# Patient Record
Sex: FEMALE | Race: WHITE
[De-identification: names, ages, dates, MRNs, and addresses within clinical notes are randomized per-mention and may not be internally consistent; named-entity substitution may affect disease eponyms.]

---

## 2020-10-07 ENCOUNTER — HOSPITAL ENCOUNTER (INPATIENT)
Dept: HOSPITAL 35 - ER | Age: 60
LOS: 12 days | Discharge: HOME HEALTH | DRG: 917 | End: 2020-10-19
Attending: HOSPITALIST | Admitting: HOSPITALIST
Payer: COMMERCIAL

## 2020-10-07 VITALS — SYSTOLIC BLOOD PRESSURE: 102 MMHG | DIASTOLIC BLOOD PRESSURE: 65 MMHG

## 2020-10-07 VITALS — DIASTOLIC BLOOD PRESSURE: 62 MMHG | SYSTOLIC BLOOD PRESSURE: 98 MMHG

## 2020-10-07 VITALS — DIASTOLIC BLOOD PRESSURE: 64 MMHG | SYSTOLIC BLOOD PRESSURE: 105 MMHG

## 2020-10-07 VITALS — WEIGHT: 275 LBS | HEIGHT: 65 IN | BODY MASS INDEX: 45.82 KG/M2

## 2020-10-07 VITALS — SYSTOLIC BLOOD PRESSURE: 106 MMHG | DIASTOLIC BLOOD PRESSURE: 64 MMHG

## 2020-10-07 VITALS — SYSTOLIC BLOOD PRESSURE: 125 MMHG | DIASTOLIC BLOOD PRESSURE: 82 MMHG

## 2020-10-07 VITALS — DIASTOLIC BLOOD PRESSURE: 69 MMHG | SYSTOLIC BLOOD PRESSURE: 105 MMHG

## 2020-10-07 VITALS — SYSTOLIC BLOOD PRESSURE: 101 MMHG | DIASTOLIC BLOOD PRESSURE: 61 MMHG

## 2020-10-07 VITALS — SYSTOLIC BLOOD PRESSURE: 101 MMHG | DIASTOLIC BLOOD PRESSURE: 62 MMHG

## 2020-10-07 VITALS — SYSTOLIC BLOOD PRESSURE: 105 MMHG | DIASTOLIC BLOOD PRESSURE: 65 MMHG

## 2020-10-07 VITALS — DIASTOLIC BLOOD PRESSURE: 65 MMHG | SYSTOLIC BLOOD PRESSURE: 109 MMHG

## 2020-10-07 VITALS — DIASTOLIC BLOOD PRESSURE: 78 MMHG | SYSTOLIC BLOOD PRESSURE: 106 MMHG

## 2020-10-07 VITALS — DIASTOLIC BLOOD PRESSURE: 67 MMHG | SYSTOLIC BLOOD PRESSURE: 105 MMHG

## 2020-10-07 VITALS — SYSTOLIC BLOOD PRESSURE: 107 MMHG | DIASTOLIC BLOOD PRESSURE: 44 MMHG

## 2020-10-07 VITALS — SYSTOLIC BLOOD PRESSURE: 101 MMHG | DIASTOLIC BLOOD PRESSURE: 55 MMHG

## 2020-10-07 VITALS — DIASTOLIC BLOOD PRESSURE: 58 MMHG | SYSTOLIC BLOOD PRESSURE: 96 MMHG

## 2020-10-07 VITALS — DIASTOLIC BLOOD PRESSURE: 67 MMHG | SYSTOLIC BLOOD PRESSURE: 99 MMHG

## 2020-10-07 VITALS — SYSTOLIC BLOOD PRESSURE: 123 MMHG | DIASTOLIC BLOOD PRESSURE: 78 MMHG

## 2020-10-07 DIAGNOSIS — J96.21: ICD-10-CM

## 2020-10-07 DIAGNOSIS — Z88.8: ICD-10-CM

## 2020-10-07 DIAGNOSIS — I10: ICD-10-CM

## 2020-10-07 DIAGNOSIS — E66.01: ICD-10-CM

## 2020-10-07 DIAGNOSIS — Z20.828: ICD-10-CM

## 2020-10-07 DIAGNOSIS — G89.4: ICD-10-CM

## 2020-10-07 DIAGNOSIS — E78.5: ICD-10-CM

## 2020-10-07 DIAGNOSIS — G93.41: ICD-10-CM

## 2020-10-07 DIAGNOSIS — I48.0: ICD-10-CM

## 2020-10-07 DIAGNOSIS — E11.42: ICD-10-CM

## 2020-10-07 DIAGNOSIS — E87.6: ICD-10-CM

## 2020-10-07 DIAGNOSIS — R13.10: ICD-10-CM

## 2020-10-07 DIAGNOSIS — Z79.899: ICD-10-CM

## 2020-10-07 DIAGNOSIS — F32.9: ICD-10-CM

## 2020-10-07 DIAGNOSIS — Z91.040: ICD-10-CM

## 2020-10-07 DIAGNOSIS — G47.33: ICD-10-CM

## 2020-10-07 DIAGNOSIS — Z79.4: ICD-10-CM

## 2020-10-07 DIAGNOSIS — E83.42: ICD-10-CM
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
## 2020-10-07 DIAGNOSIS — J96.22: ICD-10-CM

## 2020-10-07 DIAGNOSIS — Z88.0: ICD-10-CM

## 2020-10-07 DIAGNOSIS — K59.00: ICD-10-CM

## 2020-10-07 DIAGNOSIS — G93.1: ICD-10-CM

## 2020-10-07 DIAGNOSIS — T40.691A: Primary | ICD-10-CM

## 2020-10-07 DIAGNOSIS — M19.90: ICD-10-CM

## 2020-10-07 DIAGNOSIS — F41.9: ICD-10-CM

## 2020-10-07 DIAGNOSIS — Z86.73: ICD-10-CM

## 2020-10-07 DIAGNOSIS — Z79.82: ICD-10-CM

## 2020-10-07 DIAGNOSIS — K21.9: ICD-10-CM

## 2020-10-07 DIAGNOSIS — E87.5: ICD-10-CM

## 2020-10-07 DIAGNOSIS — J69.0: ICD-10-CM

## 2020-10-07 DIAGNOSIS — E11.65: ICD-10-CM

## 2020-10-07 DIAGNOSIS — D64.9: ICD-10-CM

## 2020-10-07 DIAGNOSIS — K81.9: ICD-10-CM

## 2020-10-07 DIAGNOSIS — Z88.1: ICD-10-CM

## 2020-10-07 LAB
ALBUMIN SERPL-MCNC: 3.6 G/DL (ref 3.4–5)
ALT SERPL-CCNC: 134 U/L (ref 30–65)
ANION GAP SERPL CALC-SCNC: 12 MMOL/L (ref 7–16)
ANION GAP SERPL CALC-SCNC: 5 MMOL/L (ref 7–16)
ANION GAP SERPL CALC-SCNC: 6 MMOL/L (ref 7–16)
AST SERPL-CCNC: 200 U/L (ref 15–37)
BACTERIA-REFLEX: (no result) /HPF
BASOPHILS NFR BLD AUTO: 0 % (ref 0–2)
BE(VIVO): 0.5 MMOL/L
BE(VIVO): 0.6 MMOL/L
BE(VIVO): 1.3 MMOL/L
BE(VIVO): 3.1 MMOL/L
BE(VIVO): 6.2 MMOL/L
BILIRUB SERPL-MCNC: 0.4 MG/DL (ref 0.2–1)
BILIRUB UR-MCNC: NEGATIVE MG/DL
BUN SERPL-MCNC: 12 MG/DL (ref 7–18)
BUN SERPL-MCNC: 13 MG/DL (ref 7–18)
BUN SERPL-MCNC: 13 MG/DL (ref 7–18)
CALCIUM SERPL-MCNC: 9 MG/DL (ref 8.5–10.1)
CALCIUM SERPL-MCNC: 9.3 MG/DL (ref 8.5–10.1)
CALCIUM SERPL-MCNC: 9.4 MG/DL (ref 8.5–10.1)
CHLORIDE SERPL-SCNC: 100 MMOL/L (ref 98–107)
CHLORIDE SERPL-SCNC: 101 MMOL/L (ref 98–107)
CHLORIDE SERPL-SCNC: 95 MMOL/L (ref 98–107)
CO2 SERPL-SCNC: 25 MMOL/L (ref 21–32)
CO2 SERPL-SCNC: 31 MMOL/L (ref 21–32)
CO2 SERPL-SCNC: 33 MMOL/L (ref 21–32)
COLOR UR: YELLOW
CREAT SERPL-MCNC: 0.8 MG/DL (ref 0.6–1)
CREAT SERPL-MCNC: 0.8 MG/DL (ref 0.6–1)
CREAT SERPL-MCNC: 1.3 MG/DL (ref 0.6–1)
EOSINOPHIL NFR BLD: 0 % (ref 0–3)
ERYTHROCYTE [DISTWIDTH] IN BLOOD BY AUTOMATED COUNT: 16.6 % (ref 10.5–14.5)
GLUCOSE SERPL-MCNC: 201 MG/DL (ref 74–106)
GLUCOSE SERPL-MCNC: 246 MG/DL (ref 74–106)
GLUCOSE SERPL-MCNC: 498 MG/DL (ref 74–106)
GRANULOCYTES NFR BLD MANUAL: 70 % (ref 36–66)
HCO3 BLD-SCNC: 31.1 MMOL/L (ref 22–26)
HCO3 BLD-SCNC: 31.1 MMOL/L (ref 22–26)
HCO3 BLD-SCNC: 31.9 MMOL/L (ref 22–26)
HCO3 BLD-SCNC: 32 MMOL/L (ref 22–26)
HCO3 BLD-SCNC: 34.6 MMOL/L (ref 22–26)
HCT VFR BLD CALC: 39.3 % (ref 37–47)
HGB BLD-MCNC: 11.4 GM/DL (ref 12–15)
KETONES UR STRIP-MCNC: NEGATIVE MG/DL
LYMPHOCYTES NFR BLD AUTO: 7 % (ref 24–44)
MAGNESIUM SERPL-MCNC: 2.1 MG/DL (ref 1.8–2.4)
MCH RBC QN AUTO: 29.1 PG (ref 26–34)
MCHC RBC AUTO-ENTMCNC: 29 G/DL (ref 28–37)
MCV RBC: 100.2 FL (ref 80–100)
MONOCYTES NFR BLD: 8 % (ref 1–8)
NEUTROPHILS # BLD: 12.3 THOU/UL (ref 1.4–8.2)
NEUTS BAND NFR BLD: 15 % (ref 0–8)
OPIATES UR-MCNC: POSITIVE NG/ML
PCO2 BLD: 45.6 MMHG (ref 35–45)
PCO2 BLD: 86.3 MMHG (ref 35–45)
PCO2 BLD: 87.4 MMHG (ref 35–45)
PCO2 BLD: 94.9 MMHG (ref 35–45)
PCO2 BLD: 98.1 MMHG (ref 35–45)
PLATELET # BLD EST: NORMAL 10*3/UL
PLATELET # BLD: 224 THOU/UL (ref 150–400)
PO2 BLD: 135.1 MMHG (ref 80–100)
PO2 BLD: 139.4 MMHG (ref 80–100)
PO2 BLD: 142.3 MMHG (ref 80–100)
PO2 BLD: 145 MMHG (ref 80–100)
PO2 BLD: 69.6 MMHG (ref 80–100)
POTASSIUM SERPL-SCNC: 4.5 MMOL/L (ref 3.5–5.1)
POTASSIUM SERPL-SCNC: 5.9 MMOL/L (ref 3.5–5.1)
POTASSIUM SERPL-SCNC: 7.2 MMOL/L (ref 3.5–5.1)
PROT SERPL-MCNC: 7.8 G/DL (ref 6.4–8.2)
RBC # BLD AUTO: 3.92 MIL/UL (ref 4.2–5)
RBC # UR STRIP: (no result) /UL
RBC MORPH BLD: NORMAL
SODIUM SERPL-SCNC: 132 MMOL/L (ref 136–145)
SODIUM SERPL-SCNC: 138 MMOL/L (ref 136–145)
SODIUM SERPL-SCNC: 138 MMOL/L (ref 136–145)
SP GR UR STRIP: 1.02 (ref 1–1.03)
SQUAMOUS: (no result) /LPF (ref 0–3)
TROPONIN I SERPL-MCNC: <0.06 NG/ML (ref ?–0.06)
URINE CLARITY: CLEAR
URINE GLUCOSE-RANDOM*: (no result)
URINE LEUKOCYTES-REFLEX: NEGATIVE
URINE NITRITE-REFLEX: NEGATIVE
URINE PROTEIN (DIPSTICK): NEGATIVE
UROBILINOGEN UR STRIP-ACNC: 0.2 E.U./DL (ref 0.2–1)
WBC # BLD AUTO: 14.5 THOU/UL (ref 4–11)

## 2020-10-07 PROCEDURE — 10078: CPT

## 2020-10-07 PROCEDURE — 10081 I&D PILONIDAL CYST COMP: CPT

## 2020-10-07 PROCEDURE — 0BH18EZ INSERTION OF ENDOTRACHEAL AIRWAY INTO TRACHEA, VIA NATURAL OR ARTIFICIAL OPENING ENDOSCOPIC: ICD-10-PCS | Performed by: HOSPITALIST

## 2020-10-07 PROCEDURE — 27000 TENOTOMY ADDUCTOR HIP PERQ: CPT

## 2020-10-07 PROCEDURE — 5A1955Z RESPIRATORY VENTILATION, GREATER THAN 96 CONSECUTIVE HOURS: ICD-10-PCS | Performed by: HOSPITALIST

## 2020-10-07 PROCEDURE — 05H533Z INSERTION OF INFUSION DEVICE INTO RIGHT SUBCLAVIAN VEIN, PERCUTANEOUS APPROACH: ICD-10-PCS | Performed by: HOSPITALIST

## 2020-10-07 PROCEDURE — B546ZZA ULTRASONOGRAPHY OF RIGHT SUBCLAVIAN VEIN, GUIDANCE: ICD-10-PCS | Performed by: HOSPITALIST

## 2020-10-07 NOTE — NUR
2123- Nurse talked with Dr. Matthews on the phone, post call of consult.
Nurse updated him on sinus rhythm status, patient intubated with possible
aspiration of vomit on bipap in ED, Insulin gtt rate and last blood glucose.
He expressed he will see her in the morning,to continue current treatments.

## 2020-10-07 NOTE — NUR
VASCULAR ACCESS CALLED TO ER FOR [ICC PLACEMENT. MEDICAL NECESSITY BY DR SALAS. ANGELES BRACHIAL WIDELY PATENT WITH USG. 5FR TL POWER PICC TRIMMED TO
36CM INSERTED TO 0CM CXR OBTAINED, PICC GOING UP IJ. UNABLE TO POWER FLUSH TO
REPOSITION SO 2ND KIT OBTAINED AND OVER THE WIRE DONE, TRIMMED AT 36CM
INSERTED TO 3CM EXTERNAL. 2ND STAT CXR ORDERED. PT TOLERATED WELL.

## 2020-10-07 NOTE — EKG
[facility]
[place]   [address]                     ELECTROCARDIOGRAM REPORT      
_______________________________________________________________________________
 
Name:       DEL DE LEON                  Room #:                     PRE [facility]..#:      [id number]                       Account #:      [id number]  
Admission:              Attend Phys:                          
Discharge:              Date of Birth:  [birth year]  
                                                          Report #: [id number]
                                                                    [id number]
_______________________________________________________________________________
THIS REPORT FOR:  
 
cc:                                
                                   
     Chirag Genao MD [facility]                                        ~
THIS REPORT FOR:   //name//                          
 
                         [facility] ED
                                       
Test Date:    2020-10-07               Test Time:    07:35:24
Pat Name:     DEL DE LEON             Department:   
Patient ID:   [id number]            Room:          
Gender:       F                        Technician:   esfelipa
:          1960               Requested By: Albin Kunz
Order Number: [id number] MD:   Chirag Genao
                                 Measurements
Intervals                              Axis          
Rate:         104                      P:            54
SC:           156                      QRS:          72
QRSD:         103                      T:            54
QT:           329                                    
QTc:          433                                    
                           Interpretive Statements
Sinus tachycardia
Otherwise normal tracing
No previous ECG available for comparison
Electronically Signed On 10-7-2020 7:46:11 CDT by Chirag Genao
[email or web address]
 
 
 
 
 
 
 
 
 
 
 
 
 
 
 
 
  <ELECTRONICALLY SIGNED>
   By: Chirag Genao MD, FAC   
  10/07/20     0746
D: 10/07/20 0735                           _____________________________________
T: 10/07/20 0735                           Chirag Genao MD, FACC     /EPI

## 2020-10-07 NOTE — NUR
2125: Nurse informed NP, Polly, of the temperature obtained at admit to ICU.
Patient had multiple blankets around her at that time. Nurse removed all
blankets, bathed patient, cooled off room, and dimmed lights. Nurse to
reassess temperature in the 2100 hour.

## 2020-10-07 NOTE — NUR
2025- Patient admitted to ICU and was placed onto ICU bed without
complication. Assessment performed, skin assessed, belongings noted, gtts
noted.
Nurse later gave bath, started IV fluids, sent mrsa nasal swab, and reviewed
orders. Nurse called for restraint orders as patient transfered from ED with
restraints in place.

## 2020-10-07 NOTE — NUR
2ND CXR PICC STILL NOT POSITIONED CORRECTLY SO LIJ WIDELY PATENT WITH USG. 6FR
TL POWER JACC 25CM INSERTED TO 8CM EXTERNAL. STAT CXR CONFIRMED CAJ. PICC
REMOVED AND LIJ RELEASED FOR IMMEDIATE USE PER PROTOCOL TO ER STAFF.

## 2020-10-08 VITALS — DIASTOLIC BLOOD PRESSURE: 70 MMHG | SYSTOLIC BLOOD PRESSURE: 121 MMHG

## 2020-10-08 VITALS — DIASTOLIC BLOOD PRESSURE: 53 MMHG | SYSTOLIC BLOOD PRESSURE: 93 MMHG

## 2020-10-08 VITALS — SYSTOLIC BLOOD PRESSURE: 93 MMHG | DIASTOLIC BLOOD PRESSURE: 49 MMHG

## 2020-10-08 VITALS — DIASTOLIC BLOOD PRESSURE: 70 MMHG | SYSTOLIC BLOOD PRESSURE: 131 MMHG

## 2020-10-08 VITALS — SYSTOLIC BLOOD PRESSURE: 139 MMHG | DIASTOLIC BLOOD PRESSURE: 81 MMHG

## 2020-10-08 VITALS — DIASTOLIC BLOOD PRESSURE: 64 MMHG | SYSTOLIC BLOOD PRESSURE: 104 MMHG

## 2020-10-08 VITALS — SYSTOLIC BLOOD PRESSURE: 134 MMHG | DIASTOLIC BLOOD PRESSURE: 69 MMHG

## 2020-10-08 VITALS — DIASTOLIC BLOOD PRESSURE: 59 MMHG | SYSTOLIC BLOOD PRESSURE: 107 MMHG

## 2020-10-08 VITALS — DIASTOLIC BLOOD PRESSURE: 59 MMHG | SYSTOLIC BLOOD PRESSURE: 100 MMHG

## 2020-10-08 VITALS — DIASTOLIC BLOOD PRESSURE: 70 MMHG | SYSTOLIC BLOOD PRESSURE: 129 MMHG

## 2020-10-08 VITALS — DIASTOLIC BLOOD PRESSURE: 60 MMHG | SYSTOLIC BLOOD PRESSURE: 100 MMHG

## 2020-10-08 VITALS — DIASTOLIC BLOOD PRESSURE: 68 MMHG | SYSTOLIC BLOOD PRESSURE: 130 MMHG

## 2020-10-08 VITALS — SYSTOLIC BLOOD PRESSURE: 128 MMHG | DIASTOLIC BLOOD PRESSURE: 65 MMHG

## 2020-10-08 VITALS — SYSTOLIC BLOOD PRESSURE: 93 MMHG | DIASTOLIC BLOOD PRESSURE: 56 MMHG

## 2020-10-08 VITALS — DIASTOLIC BLOOD PRESSURE: 71 MMHG | SYSTOLIC BLOOD PRESSURE: 119 MMHG

## 2020-10-08 VITALS — SYSTOLIC BLOOD PRESSURE: 99 MMHG | DIASTOLIC BLOOD PRESSURE: 59 MMHG

## 2020-10-08 VITALS — DIASTOLIC BLOOD PRESSURE: 69 MMHG | SYSTOLIC BLOOD PRESSURE: 121 MMHG

## 2020-10-08 VITALS — SYSTOLIC BLOOD PRESSURE: 108 MMHG | DIASTOLIC BLOOD PRESSURE: 60 MMHG

## 2020-10-08 VITALS — SYSTOLIC BLOOD PRESSURE: 133 MMHG | DIASTOLIC BLOOD PRESSURE: 74 MMHG

## 2020-10-08 VITALS — DIASTOLIC BLOOD PRESSURE: 65 MMHG | SYSTOLIC BLOOD PRESSURE: 122 MMHG

## 2020-10-08 VITALS — DIASTOLIC BLOOD PRESSURE: 60 MMHG | SYSTOLIC BLOOD PRESSURE: 117 MMHG

## 2020-10-08 VITALS — SYSTOLIC BLOOD PRESSURE: 106 MMHG | DIASTOLIC BLOOD PRESSURE: 60 MMHG

## 2020-10-08 VITALS — DIASTOLIC BLOOD PRESSURE: 74 MMHG | SYSTOLIC BLOOD PRESSURE: 125 MMHG

## 2020-10-08 VITALS — DIASTOLIC BLOOD PRESSURE: 66 MMHG | SYSTOLIC BLOOD PRESSURE: 107 MMHG

## 2020-10-08 VITALS — DIASTOLIC BLOOD PRESSURE: 68 MMHG | SYSTOLIC BLOOD PRESSURE: 122 MMHG

## 2020-10-08 VITALS — SYSTOLIC BLOOD PRESSURE: 125 MMHG | DIASTOLIC BLOOD PRESSURE: 62 MMHG

## 2020-10-08 VITALS — SYSTOLIC BLOOD PRESSURE: 115 MMHG | DIASTOLIC BLOOD PRESSURE: 67 MMHG

## 2020-10-08 VITALS — SYSTOLIC BLOOD PRESSURE: 127 MMHG | DIASTOLIC BLOOD PRESSURE: 69 MMHG

## 2020-10-08 VITALS — DIASTOLIC BLOOD PRESSURE: 74 MMHG | SYSTOLIC BLOOD PRESSURE: 121 MMHG

## 2020-10-08 VITALS — DIASTOLIC BLOOD PRESSURE: 70 MMHG | SYSTOLIC BLOOD PRESSURE: 115 MMHG

## 2020-10-08 VITALS — DIASTOLIC BLOOD PRESSURE: 73 MMHG | SYSTOLIC BLOOD PRESSURE: 120 MMHG

## 2020-10-08 VITALS — DIASTOLIC BLOOD PRESSURE: 64 MMHG | SYSTOLIC BLOOD PRESSURE: 107 MMHG

## 2020-10-08 VITALS — DIASTOLIC BLOOD PRESSURE: 61 MMHG | SYSTOLIC BLOOD PRESSURE: 100 MMHG

## 2020-10-08 VITALS — SYSTOLIC BLOOD PRESSURE: 97 MMHG | DIASTOLIC BLOOD PRESSURE: 54 MMHG

## 2020-10-08 VITALS — SYSTOLIC BLOOD PRESSURE: 128 MMHG | DIASTOLIC BLOOD PRESSURE: 77 MMHG

## 2020-10-08 VITALS — SYSTOLIC BLOOD PRESSURE: 123 MMHG | DIASTOLIC BLOOD PRESSURE: 74 MMHG

## 2020-10-08 VITALS — SYSTOLIC BLOOD PRESSURE: 142 MMHG | DIASTOLIC BLOOD PRESSURE: 74 MMHG

## 2020-10-08 VITALS — DIASTOLIC BLOOD PRESSURE: 72 MMHG | SYSTOLIC BLOOD PRESSURE: 143 MMHG

## 2020-10-08 VITALS — DIASTOLIC BLOOD PRESSURE: 55 MMHG | SYSTOLIC BLOOD PRESSURE: 95 MMHG

## 2020-10-08 VITALS — SYSTOLIC BLOOD PRESSURE: 123 MMHG | DIASTOLIC BLOOD PRESSURE: 66 MMHG

## 2020-10-08 VITALS — SYSTOLIC BLOOD PRESSURE: 94 MMHG | DIASTOLIC BLOOD PRESSURE: 55 MMHG

## 2020-10-08 VITALS — SYSTOLIC BLOOD PRESSURE: 96 MMHG | DIASTOLIC BLOOD PRESSURE: 52 MMHG

## 2020-10-08 VITALS — SYSTOLIC BLOOD PRESSURE: 106 MMHG | DIASTOLIC BLOOD PRESSURE: 67 MMHG

## 2020-10-08 VITALS — DIASTOLIC BLOOD PRESSURE: 72 MMHG | SYSTOLIC BLOOD PRESSURE: 123 MMHG

## 2020-10-08 VITALS — SYSTOLIC BLOOD PRESSURE: 105 MMHG | DIASTOLIC BLOOD PRESSURE: 65 MMHG

## 2020-10-08 VITALS — SYSTOLIC BLOOD PRESSURE: 97 MMHG | DIASTOLIC BLOOD PRESSURE: 58 MMHG

## 2020-10-08 VITALS — DIASTOLIC BLOOD PRESSURE: 69 MMHG | SYSTOLIC BLOOD PRESSURE: 126 MMHG

## 2020-10-08 VITALS — SYSTOLIC BLOOD PRESSURE: 110 MMHG | DIASTOLIC BLOOD PRESSURE: 66 MMHG

## 2020-10-08 LAB
ANION GAP SERPL CALC-SCNC: 9 MMOL/L (ref 7–16)
BUN SERPL-MCNC: 10 MG/DL (ref 7–18)
CALCIUM SERPL-MCNC: 8.8 MG/DL (ref 8.5–10.1)
CHLORIDE SERPL-SCNC: 101 MMOL/L (ref 98–107)
CO2 SERPL-SCNC: 30 MMOL/L (ref 21–32)
CREAT SERPL-MCNC: 0.6 MG/DL (ref 0.6–1)
EST. AVERAGE GLUCOSE BLD GHB EST-MCNC: 166 MG/DL
GLUCOSE SERPL-MCNC: 116 MG/DL (ref 74–106)
GLYCOHEMOGLOBIN (HGB A1C): 7.4 % (ref 4.8–5.6)
POTASSIUM SERPL-SCNC: 3.2 MMOL/L (ref 3.5–5.1)
SODIUM SERPL-SCNC: 140 MMOL/L (ref 136–145)

## 2020-10-08 NOTE — NUR
Nutition: Pt not appropriate for tube feeds at present however if able to
start over weekend, REC Vital HP at 30 mL/hr with current propofol demands.

## 2020-10-08 NOTE — HC
[facility]
[address]
[place]   [address]                     CONSULTATION                  
_______________________________________________________________________________
 
Name:       DEL DE LEON                  Room #:         [facility]-       ADM IN  
M.R.#:      [id number]                       Account #:      [id number]  
Admission:  10/07/20    Attend Phys:    Lior López MD 
Discharge:              Date of Birth:  [birth year]  
                                                          Report #: [id number]
                                                                    [id number]   
_______________________________________________________________________________
THIS REPORT FOR:  
 
cc:  [facility] - Family physician unknown
     [facility] - Family physician unknown                                       
     Leatha Matthews MD                                         ~
CC: [facility] unknown
    Lior López
 
DATE OF SERVICE:  10/07/2020
 
 
ENDOCRINE CONSULTATION NOTE
 
CONSULTING PHYSICIAN:  Lior López M.D.
 
REASON FOR CONSULTATION:  Uncontrolled type 2 diabetes mellitus, severe
hyperglycemia.
 
HISTORY OF PRESENT ILLNESS:  This is a 60-year-old female patient whose medical
background is significant for type 2 diabetes mellitus, diabetic neuropathy,
hyperlipidemia and osteoarthritis, who presented here with decreased level of
consciousness, labored breathing and was later found to be in respiratory
failure.  The patient had a recent spinal stimulator implant yesterday and was
found today's morning unresponsive and lethargic.  She responded partially to
the administration of Narcan, but again became obtunded.  When I saw her in the
ER, the patient was unresponsive, nonverbal and was on BiPAP therapy.
 
Having reviewed the patient's medical records, it appears that she has had type
2 diabetes mellitus for at least several years.  She was most recently
maintained on sitagliptin 50 mg daily and Lantus insulin 30 units at bedtime. 
Given the patient's nonverbal state, it was not possible to obtain a detailed
outlook on her recent level of glycemic control nor whether or not she had
further diabetic complications associated with her course such as diabetic
retinopathy, coronary artery disease or peripheral vascular disease.  However,
it appears that she has been dealing with neuropathy judging by her maintenance
on gabapentin 400 mg t.i.d.
 
On arrival to the ER, the patient was severely hyperglycemic with blood glucose
of near 500 mg/dL along with hyperkalemia at potassium of 7.2.
 
REVIEW OF SYSTEMS:  Could not be obtained at this time due to the patient's
nonverbal, noncommunicative state.
 
PAST MEDICAL HISTORY:
1.  Type 2 diabetes mellitus.
2.  Degenerative joint disease.
 
 
 
[facility]
[address] [place]   [address]                     CONSULTATION                  
_______________________________________________________________________________
 
Name:       DEL DE LEON                  Room #:         243-P       [facility] [place]  
.R.#:      [id number]                       Account #:      [id number]  
Admission:  10/07/20    Attend Phys:    Lior López MD 
Discharge:              Date of Birth:  [birth year]  
                                                          Report #: [id number]
                                                                    [id number]   
_______________________________________________________________________________
3.  Osteoarthritis.
4.  Hyperlipidemia.
5.  Hypertension.
6.  Peripheral neuropathy.
7.  GERD.
8.  Osteoarthritis.
9.  Depression.
 
OUTPATIENT MEDICATIONS:  Include Tylenol 500 mg q. 4 hours pain, aspirin 81 mg
daily, atorvastatin 40 mg daily, buspirone 10 mg p.o. t.i.d., cetirizine 10 mg
daily, diltiazem SR 60 mg daily, Flonase 1 spray nasally b.i.d., Lantus insulin
30 units at bedtime, sitagliptin 50 mg daily, Reglan 5 mg p.o. q.i.d., Mucinex
600 mg q. 12 hours, omeprazole 20 mg b.i.d., oxycodone 20 mg p.o. t.i.d.,
albuterol q. 6 hours p.r.n., Zoloft 50 mg daily, Topamax 50 mg p.o. b.i.d.,
gabapentin 400 mg p.o. t.i.d. Trulance 1 tab daily, oxycodone q. 4 to 6 hours
p.r.n.
 
ALLERGIES:  CEPHALEXIN, LATEX, METRONIDAZOLE, PENICILLIN.
 
FAMILY HISTORY:  Unknown.
 
SOCIAL HISTORY:  Unknown.
 
PHYSICAL EXAMINATION:
GENERAL:   female patient who is obtunded, nonverbal, noncommunicative
maintained on BiPAP therapy.
VITAL SIGNS:  Blood pressure 151/97 mmHg, heart rate is 106 beats per minute,
respiration 24 per minute.
CONSTITUTIONAL:  The patient is lying in bed, wearing a BiPAP mask, appears
agitated.
HEENT:  Anicteric sclerae.
NECK:  Supple, no thyromegaly.
CHEST:  Noted for diminished air entry bilaterally with coarse breath sounds,
scattered rales, but not crackles.
HEART:  Regular rate and rhythm without murmurs or gallops.
ABDOMEN:  Soft, lax.  No guarding.  Active bowel sounds.
EXTREMITIES:  Lower extremity exam, trace ankle edema bilaterally.  No skin
breaks or ulcerations.
NEUROLOGIC:  The patient is somnolent, lethargic, and noncooperative.
PSYCHIATRIC:  The patient is nonverbal, cannot evaluate at this point in time.
 
LABORATORY RESULTS:  Sodium 138, potassium on arrival was 7.2.  A followup level
an hour before this dictation was 5.9, chloride 101, CO2 of 31, anion gap 6, BUN
12, creatinine 0.8, glucose 246 at noon started out at 498 mg/dL, , total
bilirubin 0.4, calcium 9.3, magnesium 2.1, alkaline phosphatase 66, ,
total protein 7.8, albumin 3.6, EGFR 73.  Troponin negative.  White blood count
 
 
 
[facility]
[address] [place]   [address]                     CONSULTATION                  
_______________________________________________________________________________
 
Name:       DEL DE LEON                  Room #:         243-P       ADM [place]  
ROGER.#:      [id number]                       Account #:      [id number]  
Admission:  10/07/20    Attend Phys:    Lior López MD 
Discharge:              Date of Birth:  [birth year]  
                                                          Report #: [id number]
                                                                    [id number]   
_______________________________________________________________________________
14.5, hemoglobin 11.4, hematocrit 39.3, platelets 224, pH 7.18, pCO2 of 87, pO2
142, and bicarbonate 32.
 
ASSESSMENT AND PLAN:
1.  Type 2 diabetes mellitus.  This has been a longstanding issue.  However, as
noted in HPI, I could not obtain an accurate outlook of the patient's level of
control at home due to her noncommunicative state.  I will obtain a hemoglobin
A1c to help us get a better feel for the level of control.  In the immediate
setting, and given the patient's active critical medical state, severe
hyperkalemia, and severe hyperglycemia, I will initiate therapy with IV insulin
as per the [facility] IV insulin protocol.  Blood glucose
monitoring will commence hourly as per protocol.  Once the patient exhibits
adequate stability, she will be converted back to subcutaneous insulin therapy.
2.  Respiratory failure:  The patient presented in hypercarbic hypoxemic
respiratory failure and was treated with BiPAP therapy.  Whether or not she
would need to be intubated and supported with mechanical ventilation.  It is
unclear to me at this point in time, but seems likely.
3.  Hyperkalemia.  The patient presented with hyperkalemia and this responded
well to the initial measures as well as to calcium gluconate.  The patient
declined her levels significantly with initiation of IV insulin therapy.  I
believe that as her blood glucose levels have stabilized further that she might
need potassium support if she develops hypokalemia, which is possible.
4.  Hypertension.  The patient's level of blood pressure control is reasonable,
we will observe closely and initiate therapy as needed.
3.  Hyperlipidemia.  The patient is maintained on atorvastatin therapy.  This is
to be held for the time being and resume when the patient exhibits adequate
stability.
 
I certainly appreciate this consultation by Dr. López.
 
 
 
 
 
 
 
 
 
 
 
 
 
 
 
  <ELECTRONICALLY SIGNED>
   By: Leatha Matthews MD         
  10/08/20     1355
D: 10/07/20 1324                           _____________________________________
T: 10/08/20 0033                           Leatha Matthews MD           /nt

## 2020-10-08 NOTE — NUR
0700-ASSUMED CARE.--VW
1030-LORNA ELIZABETH MEDRANOE UPDATED.--VW
1130- IN.--VW
1230-WHEN TURNING PT, PT STRIKING OUT & TRYNG TO HIT NURSES. PINCHED ARM OF
NURSE,TWISTING SKIN.VERY AGITTED & ANGRY,TRYING TO REACH NURSES.NOT FOLLOWING
ANY COMMAND BUT GARCÍA. FROWNING.--VW

## 2020-10-08 NOTE — 2DMMODE
[facility]
[address]
[place]   [address]                   2 D/M-MODE ECHOCARDIOGRAM     
_______________________________________________________________________________
 
Name:       DEL DE LEON                  Room #:         243-P       ADM [place]  
M.R.#:      [id number]                       Account #:      [id number]  
Admission:  10/07/20    Attend Phys:    Lior López MD 
Discharge:              Date of Birth:  [birth year]  
                                                          Report #: [id number]
                                                                    [id number]
_______________________________________________________________________________
THIS REPORT FOR:  
 
cc:  [facility] - Family physician unknown
     [facility] - Family physician unknown
     Thomas Sam MD [facility]                                         
                                                                       ~
 
--------------- APPROVED REPORT --------------
 
 
Study performed:  10/08/2020 12:45:39
 
EXAM: Comprehensive 2D, Doppler, and color-flow 
Echocardiogram 
Patient Location: ER    
Room #:  243     Status:  routine
 
      BSA:         2.26
HR: 93 bpm BP:          100/61 mmHg 
Rhythm: NSR     
 
Other Information 
Study Quality: Poor/not all measurements taken
Technically limited study due to  morbid obesity, patient on right 
side. On vent.
 
Indications
Pleural effusion.
COPD
 
2D Dimensions
IVSd:  13.09 (7-11mm) 
LVDd:  42.20 mm  
PWd:  9.85 (7-11mm) Ascending Ao:  34.42 (22-36mm)
LVDs:  31.69 (25-40mm) 
Aortic Root:  36.09 mm 
 
Aortic Valve
AoV Peak Vasile.:  1.49 m/s 
AO Peak Gr.:  8.91 mmHg  LVOT Max P.11 mmHg
    LVOT Max V:  1.24 m/s
 
Mitral Valve
    E/A Ratio:  0.7
    MV Decel. Time:  146.50 ms
MV E Max Vasile.:  0.60 m/s 
 
 
[facility]
[address]
[place]  [address]
Phone:  [phone] 2 D/M-MODE ECHOCARDIOGRAM     
_______________________________________________________________________________
 
Name:            DEL DE LEON                  Room #:        [address] [place] [address] [place]
M.R.#:           [id number]          Account #:     [id number]  
Admission:       10/07/20         Attend Phys:   Lior López,
Discharge:                  Date of Birth: [birth year]  
                         Report #:      [id number]
        [id number]
_______________________________________________________________________________
MV A Vasile.:  0.88 m/s  
MV PHT:  42.49 ms  
 
Tricuspid Valve
 RAP Estimate:  15.00 mmHg
 
Left Ventricle
The left ventricle is normal size. There is normal LV segmental wall 
motion. There is normal left ventricular wall thickness. Left 
ventricular systolic function is normal. LVEF is 55-60%. Mild 
diastolic dysfunction is present (impaired relaxation 
pattern).
 
Right Ventricle
Right ventricle is poorly visualized.
 
Atria
The left atrium size appears normal. Right atrium is poorly 
visualized.
 
Aortic Valve
The aortic valve is normal in structure. No aortic regurgitation is 
present. There is no aortic valvular stenosis.
 
Mitral Valve
The mitral valve is normal in structure. There is no mitral valve 
regurgitation noted. No evidence of mitral valve stenosis.
 
Tricuspid Valve
Tricuspid valve is not well visualized. There is no tricuspid valve 
regurgitation noted. Unable to assess PA pressure.
 
Pulmonic Valve
Pulmonic valve is not well visualized.
 
Great Vessels
The aortic root is normal in size. The ascending aorta is normal in 
size. IVC is dilated and collapses <50% with 
inspiration.
 
Pericardium
There is no pericardial effusion.
 
<Conclusion>
Technically difficult study
Normal left ventricle size, wall thickness and systolic function 
 
 
[facility]
[address]
[place]  [address]
Phone:  [phone]                    2 D/M-MODE ECHOCARDIOGRAM     
_______________________________________________________________________________
 
Name:            DEL DE LEON                  Room #:        243-P       ADM [place]
M.R.#:           [id number]          Account #:     [id number]  
Admission:       10/07/20         Attend Phys:   Lior López,
Discharge:                  Date of Birth: [birth year]  
                         Report #:      [id number]
        [id number]
_______________________________________________________________________________
ejection fraction of 55%
Mild diastolic dysfunction
Right ventricle poorly visualized
No significant valvular dysfunction detected
No evidence of tricuspid valve insufficiency
Negative for pericardial effusion.
 
 
 
 
 
 
 
 
 
 
 
 
 
 
 
 
 
 
 
 
 
 
 
 
 
 
 
 
 
 
 
 
 
 
 
 
 
 
  <ELECTRONICALLY SIGNED>
   By: Thomas Sam MD, FACC    
  10/08/20     1326
D: 10/08/20 1326                           _____________________________________
T: 10/08/20 1326                           Thomas Sam MD, FACC      /INF

## 2020-10-09 VITALS — SYSTOLIC BLOOD PRESSURE: 145 MMHG | DIASTOLIC BLOOD PRESSURE: 71 MMHG

## 2020-10-09 VITALS — SYSTOLIC BLOOD PRESSURE: 111 MMHG | DIASTOLIC BLOOD PRESSURE: 55 MMHG

## 2020-10-09 VITALS — DIASTOLIC BLOOD PRESSURE: 62 MMHG | SYSTOLIC BLOOD PRESSURE: 110 MMHG

## 2020-10-09 VITALS — DIASTOLIC BLOOD PRESSURE: 64 MMHG | SYSTOLIC BLOOD PRESSURE: 104 MMHG

## 2020-10-09 VITALS — SYSTOLIC BLOOD PRESSURE: 139 MMHG | DIASTOLIC BLOOD PRESSURE: 73 MMHG

## 2020-10-09 VITALS — DIASTOLIC BLOOD PRESSURE: 62 MMHG | SYSTOLIC BLOOD PRESSURE: 115 MMHG

## 2020-10-09 VITALS — DIASTOLIC BLOOD PRESSURE: 71 MMHG | SYSTOLIC BLOOD PRESSURE: 127 MMHG

## 2020-10-09 VITALS — DIASTOLIC BLOOD PRESSURE: 76 MMHG | SYSTOLIC BLOOD PRESSURE: 149 MMHG

## 2020-10-09 VITALS — DIASTOLIC BLOOD PRESSURE: 73 MMHG | SYSTOLIC BLOOD PRESSURE: 126 MMHG

## 2020-10-09 VITALS — SYSTOLIC BLOOD PRESSURE: 152 MMHG | DIASTOLIC BLOOD PRESSURE: 79 MMHG

## 2020-10-09 VITALS — SYSTOLIC BLOOD PRESSURE: 149 MMHG | DIASTOLIC BLOOD PRESSURE: 72 MMHG

## 2020-10-09 VITALS — DIASTOLIC BLOOD PRESSURE: 64 MMHG | SYSTOLIC BLOOD PRESSURE: 117 MMHG

## 2020-10-09 VITALS — DIASTOLIC BLOOD PRESSURE: 72 MMHG | SYSTOLIC BLOOD PRESSURE: 144 MMHG

## 2020-10-09 VITALS — DIASTOLIC BLOOD PRESSURE: 66 MMHG | SYSTOLIC BLOOD PRESSURE: 121 MMHG

## 2020-10-09 VITALS — DIASTOLIC BLOOD PRESSURE: 76 MMHG | SYSTOLIC BLOOD PRESSURE: 136 MMHG

## 2020-10-09 VITALS — SYSTOLIC BLOOD PRESSURE: 153 MMHG | DIASTOLIC BLOOD PRESSURE: 80 MMHG

## 2020-10-09 VITALS — SYSTOLIC BLOOD PRESSURE: 120 MMHG | DIASTOLIC BLOOD PRESSURE: 66 MMHG

## 2020-10-09 VITALS — DIASTOLIC BLOOD PRESSURE: 77 MMHG | SYSTOLIC BLOOD PRESSURE: 140 MMHG

## 2020-10-09 VITALS — DIASTOLIC BLOOD PRESSURE: 70 MMHG | SYSTOLIC BLOOD PRESSURE: 129 MMHG

## 2020-10-09 VITALS — SYSTOLIC BLOOD PRESSURE: 141 MMHG | DIASTOLIC BLOOD PRESSURE: 74 MMHG

## 2020-10-09 VITALS — DIASTOLIC BLOOD PRESSURE: 68 MMHG | SYSTOLIC BLOOD PRESSURE: 124 MMHG

## 2020-10-09 VITALS — SYSTOLIC BLOOD PRESSURE: 104 MMHG | DIASTOLIC BLOOD PRESSURE: 54 MMHG

## 2020-10-09 VITALS — SYSTOLIC BLOOD PRESSURE: 102 MMHG | DIASTOLIC BLOOD PRESSURE: 56 MMHG

## 2020-10-09 VITALS — DIASTOLIC BLOOD PRESSURE: 58 MMHG | SYSTOLIC BLOOD PRESSURE: 100 MMHG

## 2020-10-09 VITALS — SYSTOLIC BLOOD PRESSURE: 145 MMHG | DIASTOLIC BLOOD PRESSURE: 78 MMHG

## 2020-10-09 LAB
ALBUMIN SERPL-MCNC: 2.7 G/DL (ref 3.4–5)
ALT SERPL-CCNC: 207 U/L (ref 30–65)
ANION GAP SERPL CALC-SCNC: 9 MMOL/L (ref 7–16)
AST SERPL-CCNC: 177 U/L (ref 15–37)
BASOPHILS NFR BLD AUTO: 0.3 % (ref 0–2)
BILIRUB SERPL-MCNC: 0.2 MG/DL (ref 0.2–1)
BUN SERPL-MCNC: 6 MG/DL (ref 7–18)
CALCIUM SERPL-MCNC: 8.6 MG/DL (ref 8.5–10.1)
CHLORIDE SERPL-SCNC: 104 MMOL/L (ref 98–107)
CO2 SERPL-SCNC: 28 MMOL/L (ref 21–32)
CREAT SERPL-MCNC: 0.5 MG/DL (ref 0.6–1)
EOSINOPHIL NFR BLD: 1 % (ref 0–3)
ERYTHROCYTE [DISTWIDTH] IN BLOOD BY AUTOMATED COUNT: 15.6 % (ref 10.5–14.5)
GLUCOSE SERPL-MCNC: 159 MG/DL (ref 74–106)
GRANULOCYTES NFR BLD MANUAL: 63.2 % (ref 36–66)
HCT VFR BLD CALC: 29.6 % (ref 37–47)
HGB BLD-MCNC: 9.5 GM/DL (ref 12–15)
LYMPHOCYTES NFR BLD AUTO: 26.6 % (ref 24–44)
MAGNESIUM SERPL-MCNC: 1.5 MG/DL (ref 1.8–2.4)
MCH RBC QN AUTO: 29.9 PG (ref 26–34)
MCHC RBC AUTO-ENTMCNC: 32.1 G/DL (ref 28–37)
MCV RBC: 93.1 FL (ref 80–100)
MONOCYTES NFR BLD: 8.9 % (ref 1–8)
NEUTROPHILS # BLD: 4.5 THOU/UL (ref 1.4–8.2)
PHOSPHATE SERPL-MCNC: 2.5 MG/DL (ref 2.5–4.9)
PLATELET # BLD: 171 THOU/UL (ref 150–400)
POTASSIUM SERPL-SCNC: 3.7 MMOL/L (ref 3.5–5.1)
PROT SERPL-MCNC: 6.1 G/DL (ref 6.4–8.2)
RBC # BLD AUTO: 3.18 MIL/UL (ref 4.2–5)
SODIUM SERPL-SCNC: 141 MMOL/L (ref 136–145)
WBC # BLD AUTO: 7.2 THOU/UL (ref 4–11)

## 2020-10-09 NOTE — NUR
This RN to bedside at 1900. Pt intubated and lightly sedated. Pt arrousable
and follows commands and shakes head appropriately. Pts VSS and urine output
adequate. Afebrile throughout the night. ET and OG tube remains at marked
entry points. Pt remains in bilateral wrist restraints. Tolerated activity
well. Will continue to monitor.

## 2020-10-09 NOTE — NUR
At 0555 patient had a 17 beat run of vtach and converted back to sinus rhythm.
This RN spoke to [facility] regarding event and discussed labs. No further
orders, instructed to pass onto day shift and continue to monitor. Strip
printed and put into patients chart.

## 2020-10-09 NOTE — NUR
Patient transfered top the 4th floor to room 450. patient able to go by
wheelchair. Pt on room air. vital stable. Patient ate 100% of her dinner
without difficulty. No c/o nausesa.

## 2020-10-09 NOTE — NUR
chart review. pt remains intubated, no anticipated dc over the weekend. will
cont following as needed for dc needs.

## 2020-10-09 NOTE — NUR
Patient rested this am, sedated with fantanyl and propofol. Patient on vent
with adequate oxygenation and respiratory effort on current setting. EEE done,
showing no seizure activity. Sedation vacation done from 1330 - 1500. Pt calm,
arouses to name, nods yes/no appropriately, follows sinple commands. Fentanyl
and propofol decreased by half the dose. Patient has remained calm, resting
easily on the vent. Pt sinus tach low 100's till about 1530 then patient went
into afib rate of 140-150's. No change in blood pressure. Noted that patient
takes diltiazem ER 60 daily. Called Ocasio, orders for cardizem, no bolus.
Started at 10 mg/hr. HR now in the 90's sinus. Blood pressure stable. Tmax
37.2. Replaced mg as well. Large u/o. No bm. Dr. Rousseau came by and clipped
the spinal stim wires at the skin. Redressed. Incisions with staples, no
redness. Spoke with daughter over the phone and gave update. Report given to
night RN.

## 2020-10-10 VITALS — DIASTOLIC BLOOD PRESSURE: 78 MMHG | SYSTOLIC BLOOD PRESSURE: 137 MMHG

## 2020-10-10 VITALS — SYSTOLIC BLOOD PRESSURE: 136 MMHG | DIASTOLIC BLOOD PRESSURE: 68 MMHG

## 2020-10-10 VITALS — SYSTOLIC BLOOD PRESSURE: 139 MMHG | DIASTOLIC BLOOD PRESSURE: 78 MMHG

## 2020-10-10 VITALS — DIASTOLIC BLOOD PRESSURE: 76 MMHG | SYSTOLIC BLOOD PRESSURE: 133 MMHG

## 2020-10-10 VITALS — DIASTOLIC BLOOD PRESSURE: 65 MMHG | SYSTOLIC BLOOD PRESSURE: 127 MMHG

## 2020-10-10 VITALS — DIASTOLIC BLOOD PRESSURE: 68 MMHG | SYSTOLIC BLOOD PRESSURE: 134 MMHG

## 2020-10-10 VITALS — DIASTOLIC BLOOD PRESSURE: 60 MMHG | SYSTOLIC BLOOD PRESSURE: 114 MMHG

## 2020-10-10 VITALS — DIASTOLIC BLOOD PRESSURE: 69 MMHG | SYSTOLIC BLOOD PRESSURE: 142 MMHG

## 2020-10-10 VITALS — DIASTOLIC BLOOD PRESSURE: 64 MMHG | SYSTOLIC BLOOD PRESSURE: 127 MMHG

## 2020-10-10 VITALS — DIASTOLIC BLOOD PRESSURE: 69 MMHG | SYSTOLIC BLOOD PRESSURE: 122 MMHG

## 2020-10-10 VITALS — DIASTOLIC BLOOD PRESSURE: 72 MMHG | SYSTOLIC BLOOD PRESSURE: 147 MMHG

## 2020-10-10 VITALS — SYSTOLIC BLOOD PRESSURE: 136 MMHG | DIASTOLIC BLOOD PRESSURE: 72 MMHG

## 2020-10-10 VITALS — DIASTOLIC BLOOD PRESSURE: 66 MMHG | SYSTOLIC BLOOD PRESSURE: 124 MMHG

## 2020-10-10 VITALS — DIASTOLIC BLOOD PRESSURE: 68 MMHG | SYSTOLIC BLOOD PRESSURE: 120 MMHG

## 2020-10-10 VITALS — DIASTOLIC BLOOD PRESSURE: 74 MMHG | SYSTOLIC BLOOD PRESSURE: 125 MMHG

## 2020-10-10 VITALS — DIASTOLIC BLOOD PRESSURE: 80 MMHG | SYSTOLIC BLOOD PRESSURE: 138 MMHG

## 2020-10-10 VITALS — DIASTOLIC BLOOD PRESSURE: 64 MMHG | SYSTOLIC BLOOD PRESSURE: 120 MMHG

## 2020-10-10 VITALS — SYSTOLIC BLOOD PRESSURE: 137 MMHG | DIASTOLIC BLOOD PRESSURE: 73 MMHG

## 2020-10-10 VITALS — DIASTOLIC BLOOD PRESSURE: 62 MMHG | SYSTOLIC BLOOD PRESSURE: 117 MMHG

## 2020-10-10 VITALS — SYSTOLIC BLOOD PRESSURE: 142 MMHG | DIASTOLIC BLOOD PRESSURE: 72 MMHG

## 2020-10-10 VITALS — DIASTOLIC BLOOD PRESSURE: 71 MMHG | SYSTOLIC BLOOD PRESSURE: 130 MMHG

## 2020-10-10 VITALS — SYSTOLIC BLOOD PRESSURE: 112 MMHG | DIASTOLIC BLOOD PRESSURE: 67 MMHG

## 2020-10-10 VITALS — SYSTOLIC BLOOD PRESSURE: 143 MMHG | DIASTOLIC BLOOD PRESSURE: 73 MMHG

## 2020-10-10 VITALS — SYSTOLIC BLOOD PRESSURE: 130 MMHG | DIASTOLIC BLOOD PRESSURE: 74 MMHG

## 2020-10-10 VITALS — DIASTOLIC BLOOD PRESSURE: 71 MMHG | SYSTOLIC BLOOD PRESSURE: 126 MMHG

## 2020-10-10 VITALS — DIASTOLIC BLOOD PRESSURE: 76 MMHG | SYSTOLIC BLOOD PRESSURE: 130 MMHG

## 2020-10-10 VITALS — SYSTOLIC BLOOD PRESSURE: 126 MMHG | DIASTOLIC BLOOD PRESSURE: 66 MMHG

## 2020-10-10 VITALS — SYSTOLIC BLOOD PRESSURE: 118 MMHG | DIASTOLIC BLOOD PRESSURE: 63 MMHG

## 2020-10-10 VITALS — DIASTOLIC BLOOD PRESSURE: 81 MMHG | SYSTOLIC BLOOD PRESSURE: 139 MMHG

## 2020-10-10 VITALS — SYSTOLIC BLOOD PRESSURE: 128 MMHG | DIASTOLIC BLOOD PRESSURE: 72 MMHG

## 2020-10-10 VITALS — SYSTOLIC BLOOD PRESSURE: 123 MMHG | DIASTOLIC BLOOD PRESSURE: 69 MMHG

## 2020-10-10 VITALS — DIASTOLIC BLOOD PRESSURE: 70 MMHG | SYSTOLIC BLOOD PRESSURE: 129 MMHG

## 2020-10-10 VITALS — DIASTOLIC BLOOD PRESSURE: 75 MMHG | SYSTOLIC BLOOD PRESSURE: 133 MMHG

## 2020-10-10 VITALS — SYSTOLIC BLOOD PRESSURE: 128 MMHG | DIASTOLIC BLOOD PRESSURE: 69 MMHG

## 2020-10-10 VITALS — DIASTOLIC BLOOD PRESSURE: 73 MMHG | SYSTOLIC BLOOD PRESSURE: 119 MMHG

## 2020-10-10 VITALS — SYSTOLIC BLOOD PRESSURE: 113 MMHG | DIASTOLIC BLOOD PRESSURE: 64 MMHG

## 2020-10-10 VITALS — SYSTOLIC BLOOD PRESSURE: 120 MMHG | DIASTOLIC BLOOD PRESSURE: 65 MMHG

## 2020-10-10 VITALS — DIASTOLIC BLOOD PRESSURE: 63 MMHG | SYSTOLIC BLOOD PRESSURE: 116 MMHG

## 2020-10-10 VITALS — SYSTOLIC BLOOD PRESSURE: 129 MMHG | DIASTOLIC BLOOD PRESSURE: 81 MMHG

## 2020-10-10 VITALS — SYSTOLIC BLOOD PRESSURE: 128 MMHG | DIASTOLIC BLOOD PRESSURE: 66 MMHG

## 2020-10-10 VITALS — SYSTOLIC BLOOD PRESSURE: 126 MMHG | DIASTOLIC BLOOD PRESSURE: 67 MMHG

## 2020-10-10 VITALS — DIASTOLIC BLOOD PRESSURE: 62 MMHG | SYSTOLIC BLOOD PRESSURE: 119 MMHG

## 2020-10-10 VITALS — DIASTOLIC BLOOD PRESSURE: 67 MMHG | SYSTOLIC BLOOD PRESSURE: 130 MMHG

## 2020-10-10 VITALS — SYSTOLIC BLOOD PRESSURE: 129 MMHG | DIASTOLIC BLOOD PRESSURE: 71 MMHG

## 2020-10-10 VITALS — SYSTOLIC BLOOD PRESSURE: 137 MMHG | DIASTOLIC BLOOD PRESSURE: 69 MMHG

## 2020-10-10 VITALS — DIASTOLIC BLOOD PRESSURE: 64 MMHG | SYSTOLIC BLOOD PRESSURE: 119 MMHG

## 2020-10-10 LAB
ANION GAP SERPL CALC-SCNC: 9 MMOL/L (ref 7–16)
BE(VIVO): 1.6 MMOL/L
BUN SERPL-MCNC: 4 MG/DL (ref 7–18)
CALCIUM SERPL-MCNC: 8.5 MG/DL (ref 8.5–10.1)
CHLORIDE SERPL-SCNC: 108 MMOL/L (ref 98–107)
CO2 SERPL-SCNC: 26 MMOL/L (ref 21–32)
CREAT SERPL-MCNC: 0.5 MG/DL (ref 0.6–1)
ERYTHROCYTE [DISTWIDTH] IN BLOOD BY AUTOMATED COUNT: 15.7 % (ref 10.5–14.5)
GLUCOSE SERPL-MCNC: 155 MG/DL (ref 74–106)
HCO3 BLD-SCNC: 26 MMOL/L (ref 22–26)
HCT VFR BLD CALC: 28.7 % (ref 37–47)
HGB BLD-MCNC: 9.2 GM/DL (ref 12–15)
MCH RBC QN AUTO: 29.9 PG (ref 26–34)
MCHC RBC AUTO-ENTMCNC: 31.9 G/DL (ref 28–37)
MCV RBC: 93.7 FL (ref 80–100)
PCO2 BLD: 40.1 MMHG (ref 35–45)
PLATELET # BLD: 162 THOU/UL (ref 150–400)
PO2 BLD: 112.2 MMHG (ref 80–100)
POTASSIUM SERPL-SCNC: 3.2 MMOL/L (ref 3.5–5.1)
RBC # BLD AUTO: 3.07 MIL/UL (ref 4.2–5)
SODIUM SERPL-SCNC: 143 MMOL/L (ref 136–145)
WBC # BLD AUTO: 6.3 THOU/UL (ref 4–11)

## 2020-10-10 NOTE — NUR
Patient intermittently restless last night. When awake peak pressures are
elevated, tidal volumes are not consistent, and she is restless. Sedation as
documented. Patient not progressing towards plan of care as evidenced by
continued need for ventilator, need for cardizem gtt, requirement of
sedatives. Nurse to continue to monitor patient status.

## 2020-10-11 VITALS — SYSTOLIC BLOOD PRESSURE: 127 MMHG | DIASTOLIC BLOOD PRESSURE: 73 MMHG

## 2020-10-11 VITALS — SYSTOLIC BLOOD PRESSURE: 124 MMHG | DIASTOLIC BLOOD PRESSURE: 80 MMHG

## 2020-10-11 VITALS — DIASTOLIC BLOOD PRESSURE: 79 MMHG | SYSTOLIC BLOOD PRESSURE: 136 MMHG

## 2020-10-11 VITALS — DIASTOLIC BLOOD PRESSURE: 73 MMHG | SYSTOLIC BLOOD PRESSURE: 125 MMHG

## 2020-10-11 VITALS — DIASTOLIC BLOOD PRESSURE: 75 MMHG | SYSTOLIC BLOOD PRESSURE: 132 MMHG

## 2020-10-11 VITALS — DIASTOLIC BLOOD PRESSURE: 80 MMHG | SYSTOLIC BLOOD PRESSURE: 130 MMHG

## 2020-10-11 VITALS — DIASTOLIC BLOOD PRESSURE: 80 MMHG | SYSTOLIC BLOOD PRESSURE: 140 MMHG

## 2020-10-11 VITALS — SYSTOLIC BLOOD PRESSURE: 141 MMHG | DIASTOLIC BLOOD PRESSURE: 85 MMHG

## 2020-10-11 VITALS — DIASTOLIC BLOOD PRESSURE: 81 MMHG | SYSTOLIC BLOOD PRESSURE: 126 MMHG

## 2020-10-11 VITALS — SYSTOLIC BLOOD PRESSURE: 122 MMHG | DIASTOLIC BLOOD PRESSURE: 79 MMHG

## 2020-10-11 VITALS — SYSTOLIC BLOOD PRESSURE: 147 MMHG | DIASTOLIC BLOOD PRESSURE: 81 MMHG

## 2020-10-11 VITALS — SYSTOLIC BLOOD PRESSURE: 115 MMHG | DIASTOLIC BLOOD PRESSURE: 68 MMHG

## 2020-10-11 VITALS — SYSTOLIC BLOOD PRESSURE: 127 MMHG | DIASTOLIC BLOOD PRESSURE: 75 MMHG

## 2020-10-11 VITALS — SYSTOLIC BLOOD PRESSURE: 139 MMHG | DIASTOLIC BLOOD PRESSURE: 83 MMHG

## 2020-10-11 VITALS — SYSTOLIC BLOOD PRESSURE: 127 MMHG | DIASTOLIC BLOOD PRESSURE: 72 MMHG

## 2020-10-11 VITALS — SYSTOLIC BLOOD PRESSURE: 137 MMHG | DIASTOLIC BLOOD PRESSURE: 79 MMHG

## 2020-10-11 VITALS — DIASTOLIC BLOOD PRESSURE: 84 MMHG | SYSTOLIC BLOOD PRESSURE: 125 MMHG

## 2020-10-11 VITALS — DIASTOLIC BLOOD PRESSURE: 86 MMHG | SYSTOLIC BLOOD PRESSURE: 141 MMHG

## 2020-10-11 VITALS — DIASTOLIC BLOOD PRESSURE: 72 MMHG | SYSTOLIC BLOOD PRESSURE: 122 MMHG

## 2020-10-11 VITALS — DIASTOLIC BLOOD PRESSURE: 72 MMHG | SYSTOLIC BLOOD PRESSURE: 124 MMHG

## 2020-10-11 VITALS — SYSTOLIC BLOOD PRESSURE: 129 MMHG | DIASTOLIC BLOOD PRESSURE: 74 MMHG

## 2020-10-11 VITALS — SYSTOLIC BLOOD PRESSURE: 128 MMHG | DIASTOLIC BLOOD PRESSURE: 79 MMHG

## 2020-10-11 VITALS — DIASTOLIC BLOOD PRESSURE: 84 MMHG | SYSTOLIC BLOOD PRESSURE: 142 MMHG

## 2020-10-11 VITALS — DIASTOLIC BLOOD PRESSURE: 75 MMHG | SYSTOLIC BLOOD PRESSURE: 128 MMHG

## 2020-10-11 VITALS — DIASTOLIC BLOOD PRESSURE: 83 MMHG | SYSTOLIC BLOOD PRESSURE: 141 MMHG

## 2020-10-11 VITALS — SYSTOLIC BLOOD PRESSURE: 134 MMHG | DIASTOLIC BLOOD PRESSURE: 80 MMHG

## 2020-10-11 VITALS — SYSTOLIC BLOOD PRESSURE: 126 MMHG | DIASTOLIC BLOOD PRESSURE: 71 MMHG

## 2020-10-11 VITALS — DIASTOLIC BLOOD PRESSURE: 84 MMHG | SYSTOLIC BLOOD PRESSURE: 145 MMHG

## 2020-10-11 VITALS — DIASTOLIC BLOOD PRESSURE: 84 MMHG | SYSTOLIC BLOOD PRESSURE: 143 MMHG

## 2020-10-11 VITALS — SYSTOLIC BLOOD PRESSURE: 121 MMHG | DIASTOLIC BLOOD PRESSURE: 77 MMHG

## 2020-10-11 VITALS — DIASTOLIC BLOOD PRESSURE: 76 MMHG | SYSTOLIC BLOOD PRESSURE: 137 MMHG

## 2020-10-11 VITALS — SYSTOLIC BLOOD PRESSURE: 141 MMHG | DIASTOLIC BLOOD PRESSURE: 80 MMHG

## 2020-10-11 VITALS — DIASTOLIC BLOOD PRESSURE: 79 MMHG | SYSTOLIC BLOOD PRESSURE: 140 MMHG

## 2020-10-11 VITALS — DIASTOLIC BLOOD PRESSURE: 71 MMHG | SYSTOLIC BLOOD PRESSURE: 125 MMHG

## 2020-10-11 VITALS — SYSTOLIC BLOOD PRESSURE: 132 MMHG | DIASTOLIC BLOOD PRESSURE: 76 MMHG

## 2020-10-11 VITALS — SYSTOLIC BLOOD PRESSURE: 142 MMHG | DIASTOLIC BLOOD PRESSURE: 80 MMHG

## 2020-10-11 VITALS — DIASTOLIC BLOOD PRESSURE: 80 MMHG | SYSTOLIC BLOOD PRESSURE: 136 MMHG

## 2020-10-11 VITALS — SYSTOLIC BLOOD PRESSURE: 124 MMHG | DIASTOLIC BLOOD PRESSURE: 73 MMHG

## 2020-10-11 VITALS — SYSTOLIC BLOOD PRESSURE: 125 MMHG | DIASTOLIC BLOOD PRESSURE: 73 MMHG

## 2020-10-11 LAB
ANION GAP SERPL CALC-SCNC: 10 MMOL/L (ref 7–16)
BUN SERPL-MCNC: 5 MG/DL (ref 7–18)
CALCIUM SERPL-MCNC: 8.5 MG/DL (ref 8.5–10.1)
CHLORIDE SERPL-SCNC: 109 MMOL/L (ref 98–107)
CO2 SERPL-SCNC: 24 MMOL/L (ref 21–32)
CREAT SERPL-MCNC: 0.4 MG/DL (ref 0.6–1)
ERYTHROCYTE [DISTWIDTH] IN BLOOD BY AUTOMATED COUNT: 16.2 % (ref 10.5–14.5)
GLUCOSE SERPL-MCNC: 161 MG/DL (ref 74–106)
HCT VFR BLD CALC: 29.7 % (ref 37–47)
HGB BLD-MCNC: 9.3 GM/DL (ref 12–15)
MCH RBC QN AUTO: 29.4 PG (ref 26–34)
MCHC RBC AUTO-ENTMCNC: 31.2 G/DL (ref 28–37)
MCV RBC: 94 FL (ref 80–100)
PLATELET # BLD: 153 THOU/UL (ref 150–400)
POTASSIUM SERPL-SCNC: 3.4 MMOL/L (ref 3.5–5.1)
RBC # BLD AUTO: 3.16 MIL/UL (ref 4.2–5)
SODIUM SERPL-SCNC: 143 MMOL/L (ref 136–145)
WBC # BLD AUTO: 5.6 THOU/UL (ref 4–11)

## 2020-10-11 NOTE — NUR
TOOK OVER PATIENT CARE FOR A SHORT WHILE THIS AFTERNOON, PATIENT ON THE VENT
AND SEDATED. VITALS STABLE, GETS RESTLESS WITH STIMULATION AND IS IMPULSIVE.
ORAL CONTRAST ADMINISTERED PER OGT AT 1600.

## 2020-10-11 NOTE — NUR
Spoke briefly with Dr Ocasio. Clarified if she wanted Relistor administered
to patient.  Dr Ocasio indicated she was ordering a CT scan of abdomen
and to hold on giving medication until results of scan received.  Pt is
currently resting quietly. Priorities of patient care have not yet allowed
for wake up from sedation and CPAP trial.

## 2020-10-12 VITALS — SYSTOLIC BLOOD PRESSURE: 129 MMHG | DIASTOLIC BLOOD PRESSURE: 66 MMHG

## 2020-10-12 VITALS — SYSTOLIC BLOOD PRESSURE: 129 MMHG | DIASTOLIC BLOOD PRESSURE: 63 MMHG

## 2020-10-12 VITALS — DIASTOLIC BLOOD PRESSURE: 71 MMHG | SYSTOLIC BLOOD PRESSURE: 136 MMHG

## 2020-10-12 VITALS — DIASTOLIC BLOOD PRESSURE: 69 MMHG | SYSTOLIC BLOOD PRESSURE: 129 MMHG

## 2020-10-12 VITALS — SYSTOLIC BLOOD PRESSURE: 131 MMHG | DIASTOLIC BLOOD PRESSURE: 70 MMHG

## 2020-10-12 VITALS — SYSTOLIC BLOOD PRESSURE: 107 MMHG | DIASTOLIC BLOOD PRESSURE: 60 MMHG

## 2020-10-12 VITALS — SYSTOLIC BLOOD PRESSURE: 103 MMHG | DIASTOLIC BLOOD PRESSURE: 54 MMHG

## 2020-10-12 VITALS — DIASTOLIC BLOOD PRESSURE: 59 MMHG | SYSTOLIC BLOOD PRESSURE: 101 MMHG

## 2020-10-12 VITALS — DIASTOLIC BLOOD PRESSURE: 84 MMHG | SYSTOLIC BLOOD PRESSURE: 144 MMHG

## 2020-10-12 VITALS — SYSTOLIC BLOOD PRESSURE: 125 MMHG | DIASTOLIC BLOOD PRESSURE: 68 MMHG

## 2020-10-12 VITALS — DIASTOLIC BLOOD PRESSURE: 63 MMHG | SYSTOLIC BLOOD PRESSURE: 106 MMHG

## 2020-10-12 VITALS — DIASTOLIC BLOOD PRESSURE: 63 MMHG | SYSTOLIC BLOOD PRESSURE: 110 MMHG

## 2020-10-12 VITALS — DIASTOLIC BLOOD PRESSURE: 63 MMHG | SYSTOLIC BLOOD PRESSURE: 112 MMHG

## 2020-10-12 VITALS — DIASTOLIC BLOOD PRESSURE: 70 MMHG | SYSTOLIC BLOOD PRESSURE: 131 MMHG

## 2020-10-12 VITALS — DIASTOLIC BLOOD PRESSURE: 69 MMHG | SYSTOLIC BLOOD PRESSURE: 114 MMHG

## 2020-10-12 VITALS — SYSTOLIC BLOOD PRESSURE: 122 MMHG | DIASTOLIC BLOOD PRESSURE: 67 MMHG

## 2020-10-12 VITALS — DIASTOLIC BLOOD PRESSURE: 58 MMHG | SYSTOLIC BLOOD PRESSURE: 117 MMHG

## 2020-10-12 VITALS — DIASTOLIC BLOOD PRESSURE: 56 MMHG | SYSTOLIC BLOOD PRESSURE: 112 MMHG

## 2020-10-12 VITALS — DIASTOLIC BLOOD PRESSURE: 67 MMHG | SYSTOLIC BLOOD PRESSURE: 107 MMHG

## 2020-10-12 VITALS — SYSTOLIC BLOOD PRESSURE: 132 MMHG | DIASTOLIC BLOOD PRESSURE: 62 MMHG

## 2020-10-12 VITALS — SYSTOLIC BLOOD PRESSURE: 114 MMHG | DIASTOLIC BLOOD PRESSURE: 64 MMHG

## 2020-10-12 VITALS — DIASTOLIC BLOOD PRESSURE: 71 MMHG | SYSTOLIC BLOOD PRESSURE: 120 MMHG

## 2020-10-12 VITALS — DIASTOLIC BLOOD PRESSURE: 63 MMHG | SYSTOLIC BLOOD PRESSURE: 141 MMHG

## 2020-10-12 VITALS — SYSTOLIC BLOOD PRESSURE: 139 MMHG | DIASTOLIC BLOOD PRESSURE: 66 MMHG

## 2020-10-12 VITALS — SYSTOLIC BLOOD PRESSURE: 119 MMHG | DIASTOLIC BLOOD PRESSURE: 69 MMHG

## 2020-10-12 VITALS — DIASTOLIC BLOOD PRESSURE: 63 MMHG | SYSTOLIC BLOOD PRESSURE: 107 MMHG

## 2020-10-12 VITALS — DIASTOLIC BLOOD PRESSURE: 61 MMHG | SYSTOLIC BLOOD PRESSURE: 106 MMHG

## 2020-10-12 VITALS — DIASTOLIC BLOOD PRESSURE: 69 MMHG | SYSTOLIC BLOOD PRESSURE: 126 MMHG

## 2020-10-12 VITALS — DIASTOLIC BLOOD PRESSURE: 59 MMHG | SYSTOLIC BLOOD PRESSURE: 106 MMHG

## 2020-10-12 VITALS — SYSTOLIC BLOOD PRESSURE: 103 MMHG | DIASTOLIC BLOOD PRESSURE: 62 MMHG

## 2020-10-12 VITALS — DIASTOLIC BLOOD PRESSURE: 69 MMHG | SYSTOLIC BLOOD PRESSURE: 116 MMHG

## 2020-10-12 VITALS — SYSTOLIC BLOOD PRESSURE: 140 MMHG | DIASTOLIC BLOOD PRESSURE: 71 MMHG

## 2020-10-12 VITALS — DIASTOLIC BLOOD PRESSURE: 66 MMHG | SYSTOLIC BLOOD PRESSURE: 105 MMHG

## 2020-10-12 VITALS — SYSTOLIC BLOOD PRESSURE: 104 MMHG | DIASTOLIC BLOOD PRESSURE: 81 MMHG

## 2020-10-12 VITALS — DIASTOLIC BLOOD PRESSURE: 66 MMHG | SYSTOLIC BLOOD PRESSURE: 110 MMHG

## 2020-10-12 VITALS — SYSTOLIC BLOOD PRESSURE: 105 MMHG | DIASTOLIC BLOOD PRESSURE: 64 MMHG

## 2020-10-12 VITALS — DIASTOLIC BLOOD PRESSURE: 60 MMHG | SYSTOLIC BLOOD PRESSURE: 110 MMHG

## 2020-10-12 VITALS — SYSTOLIC BLOOD PRESSURE: 108 MMHG | DIASTOLIC BLOOD PRESSURE: 66 MMHG

## 2020-10-12 VITALS — DIASTOLIC BLOOD PRESSURE: 66 MMHG | SYSTOLIC BLOOD PRESSURE: 125 MMHG

## 2020-10-12 VITALS — SYSTOLIC BLOOD PRESSURE: 122 MMHG | DIASTOLIC BLOOD PRESSURE: 70 MMHG

## 2020-10-12 VITALS — SYSTOLIC BLOOD PRESSURE: 135 MMHG | DIASTOLIC BLOOD PRESSURE: 70 MMHG

## 2020-10-12 VITALS — DIASTOLIC BLOOD PRESSURE: 68 MMHG | SYSTOLIC BLOOD PRESSURE: 110 MMHG

## 2020-10-12 LAB
ANION GAP SERPL CALC-SCNC: 9 MMOL/L (ref 7–16)
BUN SERPL-MCNC: 4 MG/DL (ref 7–18)
CALCIUM SERPL-MCNC: 8.4 MG/DL (ref 8.5–10.1)
CHLORIDE SERPL-SCNC: 110 MMOL/L (ref 98–107)
CO2 SERPL-SCNC: 23 MMOL/L (ref 21–32)
CREAT SERPL-MCNC: 0.4 MG/DL (ref 0.6–1)
ERYTHROCYTE [DISTWIDTH] IN BLOOD BY AUTOMATED COUNT: 15.6 % (ref 10.5–14.5)
GLUCOSE SERPL-MCNC: 132 MG/DL (ref 74–106)
HCT VFR BLD CALC: 30.7 % (ref 37–47)
HGB BLD-MCNC: 9.6 GM/DL (ref 12–15)
MCH RBC QN AUTO: 29.3 PG (ref 26–34)
MCHC RBC AUTO-ENTMCNC: 31.2 G/DL (ref 28–37)
MCV RBC: 93.9 FL (ref 80–100)
PLATELET # BLD: 154 THOU/UL (ref 150–400)
POTASSIUM SERPL-SCNC: 3.6 MMOL/L (ref 3.5–5.1)
RBC # BLD AUTO: 3.27 MIL/UL (ref 4.2–5)
SODIUM SERPL-SCNC: 142 MMOL/L (ref 136–145)
WBC # BLD AUTO: 6.1 THOU/UL (ref 4–11)

## 2020-10-12 NOTE — NUR
PT WAS IN A CLINICAL TRIAL BEFORE COMING IN TO HOSPITAL, SHE HAD A LAMINECTOMY
WITH NEUROSURGICAL SURGICAL LEAD PLACED PER MALLORY AT [facility] [place], SHE STATES THE PT
WAS HALF WAY THROUGH THE PROCESS, SHE HAD THE DEVICE IMPLANTED BUT HAD AN
EXTERNAL BATTERY. THE PHYSICIAN HERE CLIPPED THE EXTERNAL LEAD PER DR KNIGHT
PHYSICIAN REQUEST, HOWEVER, MALLORY AT [facility] SAYS THAT THE PATIENT IS STILL AT
HIGH RISK FOR INFECTION UNTIL SHE EITHER HAD THE PROCESS FINISHED BY HAVING
THE INTERNAL BATTERY PLACED OR HAS THE DEVICE REMOVED. SHE STATES THAT SHE
WOULD SUGGEST PT BE TRANSFERRED TO [facility] TO SEE DR MORRELL, HE DOES  MANY OF
THESE PROCEDURES WITH THE NEUROSURGICAL SURGICAL LEAD. SHE STATES WHENEVER THE
PHSYICIANS FEEL SHE IS READY THAT THEY SUGGEST SHE HAVE SOMETHING DONE WITH
STIMULATOR.
DR FONTANA ON UNIT TO SEE PT, SPOKE WITH HIM, HE STATES THAT THE PT CAN BE
TRANSFERRED ON THE VENTILATOR AND THEY CAN DO THE PROCEDURE NEEDED AND THEN
WEAN HER OFF THE VENTILATOR. HE STATES TO SPEAK WITH DR AGUILAR REGARDING
POSSIBLE TRANSFER. MESSAGE SENT TO HER

## 2020-10-12 NOTE — NUR
kimberly notified by bedside nurse that hospitalist is wanting to start transfer to
[facility] for niharika to be seen by dr meehan rt her spinal stimulator. cm spoke with
[facility] transfer center, [facility] # [phone], faxed referral to  along
with negative covid results. will cont following as needed for dc needs.

## 2020-10-12 NOTE — EKG
[facility]
[address]
[place]   [address]                     ELECTROCARDIOGRAM REPORT      
_______________________________________________________________________________
 
Name:       DEL DE LEON                  Room #:         243-P       ADM IN  
M.R.#:      [id number]                       Account #:      [id number]  
Admission:  10/07/20    Attend Phys:    Lior López MD 
Discharge:              Date of Birth:  [birth year]  
                                                          Report #: [id number]
                                                                    [id number]
_______________________________________________________________________________
THIS REPORT FOR:  
 
cc:  FAM - Family physician unknown
     FAM - Family physician unknown
     Chirag Genao MD Located within [facility]
THIS REPORT FOR:   //name//                          
 
                          [facility]
                                       
Test Date:    2020-10-09               Test Time:    15:35:57
Pat Name:     DEL DE LEON             Department:   
Patient ID:   [id number]            Room:         [facility]
Gender:       F                        Technician:   ROM
:          1960               Requested By: Shanon Ocasio
Order Number: [id number] MD:   Chirag Genao
                                 Measurements
Intervals                              Axis          
Rate:         142                      P:            64
UT:           177                      QRS:          60
QRSD:         96                       T:            -27
QT:           308                                    
QTc:          474                                    
                           Interpretive Statements
Atrial fibrillation with a rapid ventricular response
Borderline T abnormalities, inferior leads
Compared to ECG 10/07/2020 07:35:24
Atrial fibrillation has replaced sinus rhythm
Electronically Signed On 10- 7:22:56 CDT by Chirag Genao
[email or web address]
 
 
 
 
 
 
 
 
 
 
 
 
 
 
 
  <ELECTRONICALLY SIGNED>
   By: Chirag Genao MD, [facility]   
  10/12/20     0722
D: 10/09/20 1535                           _____________________________________
T: 10/09/20 1535                           Chirag Genao MD, [facility]     /EPI

## 2020-10-12 NOTE — NUR
Nutrition: REC change IVFs to [employer]im[employer] PPN at same rate to meet-with propofol-
~100% of needs until pt able to have BM and can start tube feeds of Vital HP.

## 2020-10-13 VITALS — DIASTOLIC BLOOD PRESSURE: 69 MMHG | SYSTOLIC BLOOD PRESSURE: 130 MMHG

## 2020-10-13 VITALS — DIASTOLIC BLOOD PRESSURE: 69 MMHG | SYSTOLIC BLOOD PRESSURE: 135 MMHG

## 2020-10-13 VITALS — SYSTOLIC BLOOD PRESSURE: 113 MMHG | DIASTOLIC BLOOD PRESSURE: 60 MMHG

## 2020-10-13 VITALS — SYSTOLIC BLOOD PRESSURE: 111 MMHG | DIASTOLIC BLOOD PRESSURE: 58 MMHG

## 2020-10-13 VITALS — DIASTOLIC BLOOD PRESSURE: 64 MMHG | SYSTOLIC BLOOD PRESSURE: 120 MMHG

## 2020-10-13 VITALS — SYSTOLIC BLOOD PRESSURE: 125 MMHG | DIASTOLIC BLOOD PRESSURE: 69 MMHG

## 2020-10-13 VITALS — SYSTOLIC BLOOD PRESSURE: 122 MMHG | DIASTOLIC BLOOD PRESSURE: 65 MMHG

## 2020-10-13 VITALS — DIASTOLIC BLOOD PRESSURE: 57 MMHG | SYSTOLIC BLOOD PRESSURE: 110 MMHG

## 2020-10-13 VITALS — SYSTOLIC BLOOD PRESSURE: 102 MMHG | DIASTOLIC BLOOD PRESSURE: 58 MMHG

## 2020-10-13 VITALS — SYSTOLIC BLOOD PRESSURE: 140 MMHG | DIASTOLIC BLOOD PRESSURE: 70 MMHG

## 2020-10-13 VITALS — SYSTOLIC BLOOD PRESSURE: 126 MMHG | DIASTOLIC BLOOD PRESSURE: 65 MMHG

## 2020-10-13 VITALS — DIASTOLIC BLOOD PRESSURE: 57 MMHG | SYSTOLIC BLOOD PRESSURE: 106 MMHG

## 2020-10-13 VITALS — DIASTOLIC BLOOD PRESSURE: 62 MMHG | SYSTOLIC BLOOD PRESSURE: 120 MMHG

## 2020-10-13 VITALS — SYSTOLIC BLOOD PRESSURE: 106 MMHG | DIASTOLIC BLOOD PRESSURE: 54 MMHG

## 2020-10-13 VITALS — SYSTOLIC BLOOD PRESSURE: 109 MMHG | DIASTOLIC BLOOD PRESSURE: 51 MMHG

## 2020-10-13 VITALS — DIASTOLIC BLOOD PRESSURE: 59 MMHG | SYSTOLIC BLOOD PRESSURE: 111 MMHG

## 2020-10-13 VITALS — SYSTOLIC BLOOD PRESSURE: 128 MMHG | DIASTOLIC BLOOD PRESSURE: 67 MMHG

## 2020-10-13 VITALS — DIASTOLIC BLOOD PRESSURE: 56 MMHG | SYSTOLIC BLOOD PRESSURE: 108 MMHG

## 2020-10-13 LAB
ANION GAP SERPL CALC-SCNC: 7 MMOL/L (ref 7–16)
BUN SERPL-MCNC: 7 MG/DL (ref 7–18)
CALCIUM SERPL-MCNC: 8.7 MG/DL (ref 8.5–10.1)
CHLORIDE SERPL-SCNC: 108 MMOL/L (ref 98–107)
CO2 SERPL-SCNC: 29 MMOL/L (ref 21–32)
CREAT SERPL-MCNC: 0.5 MG/DL (ref 0.6–1)
ERYTHROCYTE [DISTWIDTH] IN BLOOD BY AUTOMATED COUNT: 15.4 % (ref 10.5–14.5)
GLUCOSE SERPL-MCNC: 170 MG/DL (ref 74–106)
HCT VFR BLD CALC: 30.2 % (ref 37–47)
HGB BLD-MCNC: 9.5 GM/DL (ref 12–15)
MCH RBC QN AUTO: 29.2 PG (ref 26–34)
MCHC RBC AUTO-ENTMCNC: 31.4 G/DL (ref 28–37)
MCV RBC: 93 FL (ref 80–100)
PLATELET # BLD: 118 THOU/UL (ref 150–400)
POTASSIUM SERPL-SCNC: 3.3 MMOL/L (ref 3.5–5.1)
RBC # BLD AUTO: 3.25 MIL/UL (ref 4.2–5)
SODIUM SERPL-SCNC: 144 MMOL/L (ref 136–145)
WBC # BLD AUTO: 7.8 THOU/UL (ref 4–11)

## 2020-10-13 NOTE — NUR
cm spoke with [facility] transfer shahnaz - [facility], cp, opr, ls and main rmc all still
closed. if they open up will call you.
[facility] - at capacity, so cant take today.

## 2020-10-14 VITALS — SYSTOLIC BLOOD PRESSURE: 134 MMHG | DIASTOLIC BLOOD PRESSURE: 76 MMHG

## 2020-10-14 VITALS — DIASTOLIC BLOOD PRESSURE: 99 MMHG | SYSTOLIC BLOOD PRESSURE: 123 MMHG

## 2020-10-14 VITALS — SYSTOLIC BLOOD PRESSURE: 150 MMHG | DIASTOLIC BLOOD PRESSURE: 84 MMHG

## 2020-10-14 VITALS — SYSTOLIC BLOOD PRESSURE: 130 MMHG | DIASTOLIC BLOOD PRESSURE: 72 MMHG

## 2020-10-14 VITALS — SYSTOLIC BLOOD PRESSURE: 152 MMHG | DIASTOLIC BLOOD PRESSURE: 80 MMHG

## 2020-10-14 VITALS — SYSTOLIC BLOOD PRESSURE: 137 MMHG | DIASTOLIC BLOOD PRESSURE: 78 MMHG

## 2020-10-14 VITALS — SYSTOLIC BLOOD PRESSURE: 136 MMHG | DIASTOLIC BLOOD PRESSURE: 78 MMHG

## 2020-10-14 VITALS — SYSTOLIC BLOOD PRESSURE: 144 MMHG | DIASTOLIC BLOOD PRESSURE: 77 MMHG

## 2020-10-14 VITALS — DIASTOLIC BLOOD PRESSURE: 82 MMHG | SYSTOLIC BLOOD PRESSURE: 140 MMHG

## 2020-10-14 VITALS — SYSTOLIC BLOOD PRESSURE: 139 MMHG | DIASTOLIC BLOOD PRESSURE: 85 MMHG

## 2020-10-14 VITALS — SYSTOLIC BLOOD PRESSURE: 133 MMHG | DIASTOLIC BLOOD PRESSURE: 78 MMHG

## 2020-10-14 VITALS — DIASTOLIC BLOOD PRESSURE: 82 MMHG | SYSTOLIC BLOOD PRESSURE: 144 MMHG

## 2020-10-14 VITALS — DIASTOLIC BLOOD PRESSURE: 73 MMHG | SYSTOLIC BLOOD PRESSURE: 137 MMHG

## 2020-10-14 VITALS — DIASTOLIC BLOOD PRESSURE: 93 MMHG | SYSTOLIC BLOOD PRESSURE: 123 MMHG

## 2020-10-14 VITALS — DIASTOLIC BLOOD PRESSURE: 76 MMHG | SYSTOLIC BLOOD PRESSURE: 127 MMHG

## 2020-10-14 VITALS — DIASTOLIC BLOOD PRESSURE: 89 MMHG | SYSTOLIC BLOOD PRESSURE: 146 MMHG

## 2020-10-14 VITALS — SYSTOLIC BLOOD PRESSURE: 154 MMHG | DIASTOLIC BLOOD PRESSURE: 91 MMHG

## 2020-10-14 VITALS — SYSTOLIC BLOOD PRESSURE: 153 MMHG | DIASTOLIC BLOOD PRESSURE: 84 MMHG

## 2020-10-14 VITALS — DIASTOLIC BLOOD PRESSURE: 75 MMHG | SYSTOLIC BLOOD PRESSURE: 128 MMHG

## 2020-10-14 VITALS — SYSTOLIC BLOOD PRESSURE: 157 MMHG | DIASTOLIC BLOOD PRESSURE: 81 MMHG

## 2020-10-14 VITALS — DIASTOLIC BLOOD PRESSURE: 77 MMHG | SYSTOLIC BLOOD PRESSURE: 146 MMHG

## 2020-10-14 VITALS — SYSTOLIC BLOOD PRESSURE: 134 MMHG | DIASTOLIC BLOOD PRESSURE: 78 MMHG

## 2020-10-14 VITALS — SYSTOLIC BLOOD PRESSURE: 151 MMHG | DIASTOLIC BLOOD PRESSURE: 83 MMHG

## 2020-10-14 VITALS — DIASTOLIC BLOOD PRESSURE: 79 MMHG | SYSTOLIC BLOOD PRESSURE: 144 MMHG

## 2020-10-14 LAB
ANION GAP SERPL CALC-SCNC: 8 MMOL/L (ref 7–16)
BE(VIVO): 3.2 MMOL/L
BUN SERPL-MCNC: 12 MG/DL (ref 7–18)
CALCIUM SERPL-MCNC: 9 MG/DL (ref 8.5–10.1)
CHLORIDE SERPL-SCNC: 107 MMOL/L (ref 98–107)
CO2 SERPL-SCNC: 28 MMOL/L (ref 21–32)
CREAT SERPL-MCNC: 0.4 MG/DL (ref 0.6–1)
ERYTHROCYTE [DISTWIDTH] IN BLOOD BY AUTOMATED COUNT: 15.4 % (ref 10.5–14.5)
GLUCOSE SERPL-MCNC: 188 MG/DL (ref 74–106)
HCO3 BLD-SCNC: 29.9 MMOL/L (ref 22–26)
HCT VFR BLD CALC: 28.5 % (ref 37–47)
HGB BLD-MCNC: 9.1 GM/DL (ref 12–15)
MCH RBC QN AUTO: 29.5 PG (ref 26–34)
MCHC RBC AUTO-ENTMCNC: 31.8 G/DL (ref 28–37)
MCV RBC: 92.8 FL (ref 80–100)
PCO2 BLD: 56.5 MMHG (ref 35–45)
PLATELET # BLD: 161 THOU/UL (ref 150–400)
PO2 BLD: 106.2 MMHG (ref 80–100)
POTASSIUM SERPL-SCNC: 3.6 MMOL/L (ref 3.5–5.1)
RBC # BLD AUTO: 3.07 MIL/UL (ref 4.2–5)
SODIUM SERPL-SCNC: 143 MMOL/L (ref 136–145)
WBC # BLD AUTO: 5.1 THOU/UL (ref 4–11)

## 2020-10-14 NOTE — NUR
CALLED NP AS PATIENT REQUESTED BREATHING TREATMENT, BUT NO PRNS AVAILABLE.
DISCUSSED STATUS, PT RR 20-25 WITH SATS 98% ON 4LNC, HOWEVER APPEARING LABORED
WITH BREATHING WITH SOME WHEEZING. ORDERS GIVEN FOR FENTANYL TO HELP WITH PAIN
AND DUONEBS PRN

## 2020-10-14 NOTE — NUR
ez with spinal stimulator called cm back from yesterday, she provided
information that  that did paddle trial spin stim was dr Jd Bourgeois from [facility] in [facility], if retracting into skin, not a concern for infection,
will reach out to your hospitalist to discuss her case and needs. cm provided
dr galicia pager number to ez, so MD and speak.
[facility] transfer also called stated opr might open today and was needed updated
progress notes for today, labs and vs faxed over.
cm checking with hospitalist to see if she still needs to be transferred or
not?

## 2020-10-14 NOTE — NUR
ASSUMED CARE @ 070 10/14/20, PT ASSESSMENTS AND VSS COMPLETE PER ICU PROTOCOL.
CPAP PERFORMED THIS AM, ABG COLLECTED AFTER AND RESULTS REPORTED TO DR FONTANA,
ORDERS RECIEVED TO EXTUBATE PT.
DR AGUILAR AND DR DEVLIN AT BEDSIDE THIS AM, UPDATE GIVEN, NO ORDERS
RECIEVED AT THIS TIME.

## 2020-10-15 VITALS — SYSTOLIC BLOOD PRESSURE: 175 MMHG | DIASTOLIC BLOOD PRESSURE: 99 MMHG

## 2020-10-15 VITALS — DIASTOLIC BLOOD PRESSURE: 85 MMHG | SYSTOLIC BLOOD PRESSURE: 154 MMHG

## 2020-10-15 VITALS — SYSTOLIC BLOOD PRESSURE: 160 MMHG | DIASTOLIC BLOOD PRESSURE: 96 MMHG

## 2020-10-15 VITALS — SYSTOLIC BLOOD PRESSURE: 153 MMHG | DIASTOLIC BLOOD PRESSURE: 82 MMHG

## 2020-10-15 VITALS — SYSTOLIC BLOOD PRESSURE: 137 MMHG | DIASTOLIC BLOOD PRESSURE: 76 MMHG

## 2020-10-15 VITALS — DIASTOLIC BLOOD PRESSURE: 56 MMHG | SYSTOLIC BLOOD PRESSURE: 188 MMHG

## 2020-10-15 VITALS — DIASTOLIC BLOOD PRESSURE: 94 MMHG | SYSTOLIC BLOOD PRESSURE: 168 MMHG

## 2020-10-15 VITALS — SYSTOLIC BLOOD PRESSURE: 142 MMHG | DIASTOLIC BLOOD PRESSURE: 80 MMHG

## 2020-10-15 VITALS — DIASTOLIC BLOOD PRESSURE: 79 MMHG | SYSTOLIC BLOOD PRESSURE: 151 MMHG

## 2020-10-15 VITALS — DIASTOLIC BLOOD PRESSURE: 81 MMHG | SYSTOLIC BLOOD PRESSURE: 145 MMHG

## 2020-10-15 VITALS — SYSTOLIC BLOOD PRESSURE: 168 MMHG | DIASTOLIC BLOOD PRESSURE: 96 MMHG

## 2020-10-15 VITALS — DIASTOLIC BLOOD PRESSURE: 103 MMHG | SYSTOLIC BLOOD PRESSURE: 162 MMHG

## 2020-10-15 VITALS — DIASTOLIC BLOOD PRESSURE: 90 MMHG | SYSTOLIC BLOOD PRESSURE: 162 MMHG

## 2020-10-15 VITALS — DIASTOLIC BLOOD PRESSURE: 91 MMHG | SYSTOLIC BLOOD PRESSURE: 163 MMHG

## 2020-10-15 VITALS — SYSTOLIC BLOOD PRESSURE: 153 MMHG | DIASTOLIC BLOOD PRESSURE: 87 MMHG

## 2020-10-15 VITALS — DIASTOLIC BLOOD PRESSURE: 91 MMHG | SYSTOLIC BLOOD PRESSURE: 178 MMHG

## 2020-10-15 VITALS — SYSTOLIC BLOOD PRESSURE: 130 MMHG | DIASTOLIC BLOOD PRESSURE: 67 MMHG

## 2020-10-15 VITALS — SYSTOLIC BLOOD PRESSURE: 130 MMHG | DIASTOLIC BLOOD PRESSURE: 76 MMHG

## 2020-10-15 VITALS — SYSTOLIC BLOOD PRESSURE: 143 MMHG | DIASTOLIC BLOOD PRESSURE: 78 MMHG

## 2020-10-15 VITALS — DIASTOLIC BLOOD PRESSURE: 74 MMHG | SYSTOLIC BLOOD PRESSURE: 133 MMHG

## 2020-10-15 VITALS — DIASTOLIC BLOOD PRESSURE: 92 MMHG | SYSTOLIC BLOOD PRESSURE: 171 MMHG

## 2020-10-15 VITALS — SYSTOLIC BLOOD PRESSURE: 137 MMHG | DIASTOLIC BLOOD PRESSURE: 74 MMHG

## 2020-10-15 VITALS — SYSTOLIC BLOOD PRESSURE: 163 MMHG | DIASTOLIC BLOOD PRESSURE: 101 MMHG

## 2020-10-15 VITALS — SYSTOLIC BLOOD PRESSURE: 159 MMHG | DIASTOLIC BLOOD PRESSURE: 84 MMHG

## 2020-10-15 VITALS — SYSTOLIC BLOOD PRESSURE: 162 MMHG | DIASTOLIC BLOOD PRESSURE: 87 MMHG

## 2020-10-15 LAB
ANION GAP SERPL CALC-SCNC: 7 MMOL/L (ref 7–16)
BUN SERPL-MCNC: 12 MG/DL (ref 7–18)
CALCIUM SERPL-MCNC: 9.3 MG/DL (ref 8.5–10.1)
CHLORIDE SERPL-SCNC: 105 MMOL/L (ref 98–107)
CO2 SERPL-SCNC: 31 MMOL/L (ref 21–32)
CREAT SERPL-MCNC: 0.4 MG/DL (ref 0.6–1)
ERYTHROCYTE [DISTWIDTH] IN BLOOD BY AUTOMATED COUNT: 15.2 % (ref 10.5–14.5)
GLUCOSE SERPL-MCNC: 159 MG/DL (ref 74–106)
HCT VFR BLD CALC: 31 % (ref 37–47)
HGB BLD-MCNC: 9.8 GM/DL (ref 12–15)
MCH RBC QN AUTO: 29.4 PG (ref 26–34)
MCHC RBC AUTO-ENTMCNC: 31.7 G/DL (ref 28–37)
MCV RBC: 92.7 FL (ref 80–100)
PLATELET # BLD: 190 THOU/UL (ref 150–400)
POTASSIUM SERPL-SCNC: 3.7 MMOL/L (ref 3.5–5.1)
RBC # BLD AUTO: 3.35 MIL/UL (ref 4.2–5)
SODIUM SERPL-SCNC: 143 MMOL/L (ref 136–145)
WBC # BLD AUTO: 6.5 THOU/UL (ref 4–11)

## 2020-10-15 NOTE — NUR
ASSESSMENTS AND INTERVENTIONS AS DOCCUMENTED. PATIENT RESTING THIS MORNING, NO
MAJOR CONCERNS. DR. THOMPSON ROUNDING ON PATIENT. NO NEW ORDERS. CARDIOLOGY
NP ROUNDING ON PATIENT, MEDS TO BE UPDATED.

## 2020-10-16 VITALS — DIASTOLIC BLOOD PRESSURE: 72 MMHG | SYSTOLIC BLOOD PRESSURE: 140 MMHG

## 2020-10-16 VITALS — SYSTOLIC BLOOD PRESSURE: 151 MMHG | DIASTOLIC BLOOD PRESSURE: 87 MMHG

## 2020-10-16 VITALS — SYSTOLIC BLOOD PRESSURE: 150 MMHG | DIASTOLIC BLOOD PRESSURE: 80 MMHG

## 2020-10-16 VITALS — DIASTOLIC BLOOD PRESSURE: 63 MMHG | SYSTOLIC BLOOD PRESSURE: 144 MMHG

## 2020-10-16 VITALS — DIASTOLIC BLOOD PRESSURE: 68 MMHG | SYSTOLIC BLOOD PRESSURE: 138 MMHG

## 2020-10-16 VITALS — DIASTOLIC BLOOD PRESSURE: 90 MMHG | SYSTOLIC BLOOD PRESSURE: 165 MMHG

## 2020-10-16 VITALS — DIASTOLIC BLOOD PRESSURE: 61 MMHG | SYSTOLIC BLOOD PRESSURE: 142 MMHG

## 2020-10-16 VITALS — SYSTOLIC BLOOD PRESSURE: 138 MMHG | DIASTOLIC BLOOD PRESSURE: 84 MMHG

## 2020-10-16 VITALS — DIASTOLIC BLOOD PRESSURE: 75 MMHG | SYSTOLIC BLOOD PRESSURE: 141 MMHG

## 2020-10-16 VITALS — SYSTOLIC BLOOD PRESSURE: 151 MMHG | DIASTOLIC BLOOD PRESSURE: 68 MMHG

## 2020-10-16 LAB
ANION GAP SERPL CALC-SCNC: 6 MMOL/L (ref 7–16)
BUN SERPL-MCNC: 11 MG/DL (ref 7–18)
CALCIUM SERPL-MCNC: 9.4 MG/DL (ref 8.5–10.1)
CHLORIDE SERPL-SCNC: 101 MMOL/L (ref 98–107)
CO2 SERPL-SCNC: 32 MMOL/L (ref 21–32)
CREAT SERPL-MCNC: 0.6 MG/DL (ref 0.6–1)
ERYTHROCYTE [DISTWIDTH] IN BLOOD BY AUTOMATED COUNT: 15.1 % (ref 10.5–14.5)
GLUCOSE SERPL-MCNC: 258 MG/DL (ref 74–106)
HCT VFR BLD CALC: 33 % (ref 37–47)
HGB BLD-MCNC: 10.4 GM/DL (ref 12–15)
MCH RBC QN AUTO: 28.7 PG (ref 26–34)
MCHC RBC AUTO-ENTMCNC: 31.5 G/DL (ref 28–37)
MCV RBC: 91.3 FL (ref 80–100)
PLATELET # BLD: 267 THOU/UL (ref 150–400)
POTASSIUM SERPL-SCNC: 4.2 MMOL/L (ref 3.5–5.1)
RBC # BLD AUTO: 3.62 MIL/UL (ref 4.2–5)
SODIUM SERPL-SCNC: 139 MMOL/L (ref 136–145)
WBC # BLD AUTO: 5.7 THOU/UL (ref 4–11)

## 2020-10-16 NOTE — NUR
PT HAD A BOWEL MOVEMENT. PT TRANSFERED TO CCU VIA WHEELCHAIR TO ROOM 217.
REPORT GIVEN TO OCTAVIANO SIMON. PT TRANSFERED SAFELY TO BED. PT ON 5L NC. PT ALERT
AND ORIENTED x4. PT BELONGINGS AND MEDICATIONS TRANSFERED WITH PT TO CCU. MEDS
GIVEN TO SHANA HSU. CONTINUE TO MONITOR.

## 2020-10-16 NOTE — NUR
PT TRANSFERED FROM THE ICU TO THEN UNIT - PT ORIENTED TO ROOM AND BEDSPACE -
ASSESSMENT AS CHARTED - ACCUCHECK COVERED PER SSI.  EMELIA PUREE DIET .  NO CO'S
OF PAIN OR NAUSEA.  PT UP TO THE BATHROOM WITH USE OF WALKEER - SOB WITH
EXERTION.  PT WITH BM PRIOR TO COMING TO THE UNIT AND ANOTHER HERE.  PT WITH
NO CO'S AT THE PRESENT TIME.

## 2020-10-16 NOTE — NUR
chart review. niharika still require oxygen. possible will be moved out if icu to
ccu. cm spoke with niharika mom mrs velarde, no concerns or needs voice during
phone call. will cont following as needed for dc needs.

## 2020-10-17 VITALS — SYSTOLIC BLOOD PRESSURE: 139 MMHG | DIASTOLIC BLOOD PRESSURE: 71 MMHG

## 2020-10-17 VITALS — SYSTOLIC BLOOD PRESSURE: 147 MMHG | DIASTOLIC BLOOD PRESSURE: 83 MMHG

## 2020-10-17 VITALS — DIASTOLIC BLOOD PRESSURE: 92 MMHG | SYSTOLIC BLOOD PRESSURE: 154 MMHG

## 2020-10-17 VITALS — DIASTOLIC BLOOD PRESSURE: 75 MMHG | SYSTOLIC BLOOD PRESSURE: 150 MMHG

## 2020-10-17 VITALS — SYSTOLIC BLOOD PRESSURE: 148 MMHG | DIASTOLIC BLOOD PRESSURE: 88 MMHG

## 2020-10-17 VITALS — SYSTOLIC BLOOD PRESSURE: 162 MMHG | DIASTOLIC BLOOD PRESSURE: 87 MMHG

## 2020-10-17 LAB
ALBUMIN SERPL-MCNC: 2.9 G/DL (ref 3.4–5)
ALT SERPL-CCNC: 40 U/L (ref 30–65)
ANION GAP SERPL CALC-SCNC: 7 MMOL/L (ref 7–16)
AST SERPL-CCNC: 23 U/L (ref 15–37)
BASOPHILS NFR BLD AUTO: 0.2 % (ref 0–2)
BILIRUB SERPL-MCNC: 0.2 MG/DL (ref 0.2–1)
BUN SERPL-MCNC: 9 MG/DL (ref 7–18)
CALCIUM SERPL-MCNC: 9.7 MG/DL (ref 8.5–10.1)
CHLORIDE SERPL-SCNC: 102 MMOL/L (ref 98–107)
CO2 SERPL-SCNC: 34 MMOL/L (ref 21–32)
CREAT SERPL-MCNC: 0.5 MG/DL (ref 0.6–1)
EOSINOPHIL NFR BLD: 0.1 % (ref 0–3)
ERYTHROCYTE [DISTWIDTH] IN BLOOD BY AUTOMATED COUNT: 14.9 % (ref 10.5–14.5)
GLUCOSE SERPL-MCNC: 177 MG/DL (ref 74–106)
GRANULOCYTES NFR BLD MANUAL: 76.3 % (ref 36–66)
HCT VFR BLD CALC: 34.4 % (ref 37–47)
HGB BLD-MCNC: 10.8 GM/DL (ref 12–15)
LYMPHOCYTES NFR BLD AUTO: 15.3 % (ref 24–44)
MAGNESIUM SERPL-MCNC: 2 MG/DL (ref 1.8–2.4)
MCH RBC QN AUTO: 29 PG (ref 26–34)
MCHC RBC AUTO-ENTMCNC: 31.3 G/DL (ref 28–37)
MCV RBC: 92.5 FL (ref 80–100)
MONOCYTES NFR BLD: 8.1 % (ref 1–8)
NEUTROPHILS # BLD: 5 THOU/UL (ref 1.4–8.2)
PHOSPHATE SERPL-MCNC: 3.2 MG/DL (ref 2.5–4.9)
PLATELET # BLD: 292 THOU/UL (ref 150–400)
POTASSIUM SERPL-SCNC: 3.9 MMOL/L (ref 3.5–5.1)
PROT SERPL-MCNC: 6.9 G/DL (ref 6.4–8.2)
RBC # BLD AUTO: 3.71 MIL/UL (ref 4.2–5)
SODIUM SERPL-SCNC: 143 MMOL/L (ref 136–145)
WBC # BLD AUTO: 6.6 THOU/UL (ref 4–11)

## 2020-10-17 NOTE — NUR
ASSUMED CARE FROM DAY SHIFT PT RESTING IN BED , PT ASSISTED TO BSC HAD LARGE
SOFT STOOL, SOA NOTED WHEN UP. DENIES PAIN, BACK DRESSING DRY AND INTACT.
CARDAIC MONITOR SHOWS NSR. PT RESTED WELL THROUHGOUT HOURLY ROUNDS WILL
CONINTUE WITH CURRENT PLAN OF CARE.

## 2020-10-17 NOTE — NUR
ASSUMMED PT CARE AT APPROXIMATELY 0700. PT A&O X4. ASSESSMENT AS CHARTED. FALL
PRECAUTIONS IN PLACE. PT DENIES HAVING CHEST PAIN. PT STATED SHE HAD BACK
PAIN. PT RECEIVED ANALGESICS. PT STATED ANALGESICS HELPED RELIEVE PAIN. VITAL
SIGNS STABLE. BLOOD SUGARS STABLE. EDUCATED PT ABOUT POC. PT STATED
UNDERSTANDING AND DENIED HAVING FURTHER QUESTIONS. STALLINGS DC. AWAITING PT TO
HAVE POST STALLINGS REMOVAL VOID. PT COMFORTABLE. PT DENIES HAVING FURTHER
CONCERNS.

## 2020-10-18 VITALS — DIASTOLIC BLOOD PRESSURE: 82 MMHG | SYSTOLIC BLOOD PRESSURE: 141 MMHG

## 2020-10-18 VITALS — SYSTOLIC BLOOD PRESSURE: 157 MMHG | DIASTOLIC BLOOD PRESSURE: 83 MMHG

## 2020-10-18 VITALS — DIASTOLIC BLOOD PRESSURE: 71 MMHG | SYSTOLIC BLOOD PRESSURE: 139 MMHG

## 2020-10-18 VITALS — SYSTOLIC BLOOD PRESSURE: 174 MMHG | DIASTOLIC BLOOD PRESSURE: 87 MMHG

## 2020-10-18 VITALS — SYSTOLIC BLOOD PRESSURE: 137 MMHG | DIASTOLIC BLOOD PRESSURE: 76 MMHG

## 2020-10-18 VITALS — SYSTOLIC BLOOD PRESSURE: 140 MMHG | DIASTOLIC BLOOD PRESSURE: 75 MMHG

## 2020-10-18 NOTE — NUR
PT IS ALERT AND ORIENTED X4. LUNGS ARE COARSE TO DIMINISHED. ON 5 LITERS
OXYGEN NASAL CANULA. SR ON THE MONITOR UP TO BEDSIDE COMMODE TO VOID PER
NURSING. EXPLAIN TO PT DIET AND SWALLOW TEST ORDERED FOR IN THE AM. HAS
STAPLES TO BACK AREA WHERE SHE HAD A BACK PAIN STIMULATOR PLACED AND REMOVED.
DENIES ANY PAIN WITH ASSESSMENT. WILL CONTINUE TO ASSESS AND MONITOR PER
MAHENDRA

## 2020-10-18 NOTE — NUR
PT CARE ASSUMED AT 0700. A&Ox4. SWALLOW TEST PENDING FOR TOMORROW. HONEY THICK
LIQUIDS. PT WILL TRY TO MANIPULATE STAFF INTO BRINGING HER THIN LIQUIDS IF
POSSIBLE. ACHS WITH STANDING SCALE AND SCLIDING SCALE. BEDSIDE COMMODE. NO
COMPLAINTS OF PAIN . DRESSING ON BACK CHANGED. IV PATENT, FLUSHES WELL DRAWS
BACK. PT DENIES PAIN. ON O2 5L FROM HOME. IV ANTIBIOTICS INFUSING. CALL LIGHT
IN REACH. WILL CONTINUE TO MONITOR.

## 2020-10-19 VITALS — SYSTOLIC BLOOD PRESSURE: 147 MMHG | DIASTOLIC BLOOD PRESSURE: 75 MMHG

## 2020-10-19 VITALS — SYSTOLIC BLOOD PRESSURE: 148 MMHG | DIASTOLIC BLOOD PRESSURE: 96 MMHG

## 2020-10-19 VITALS — DIASTOLIC BLOOD PRESSURE: 75 MMHG | SYSTOLIC BLOOD PRESSURE: 147 MMHG

## 2020-10-19 NOTE — NUR
PT IS ALERT AND ORIENTED X4. LUNGS ARE CLEAR REMAINS SLIGHT COARSE IN RUL.
GETS BREATHING TREATEMENTS PER RESPIRATORY. ABDOMEN IS ROUND AND SOFT. BOWEL
SOUNDS ACTIVE X4. COMPLAINS OF SOME BACK PAIN. DRESSING STAPLES IN BACK POST
PROCEUDRE OF BACK STIMULATOR REMOVED. UP TO VOID TO BED SIDE COMMODE WITH
ASSISTANCE. NEEDS SWALLOW TEST THIS AM AND POSSIBLE DISCHARGE TODAY IF PASSESS
SWALLOW TEST. WILL EVAULUATE TODAY TO SEE PROGRESS FOR DISCHARGE. CONTINUE ON
GOING NURSING MONITORING AND ASSESSMENT AT THIS TIME

## 2020-10-19 NOTE — NUR
Patient to dc home today. She has HH orders. Sp with patient to alert HH
orders. Discussed [place] medicaid would not cover therapy only RN. Patient reports
she lives with her dtr and grandchildren and they do everything to assist her.
She reports she has pill box and own blood pressure cuff. She does not feel HH
RN needed. She has home oxygen with [facility]. Discussed benefit of RN and
patient agreeable if it does not impede her leaving soon. DC planner arranged
HH RN with [facility] prior to patient leaving. Dtr at bedside patient eager to
dc home. Faxed clinical update to [facility] and updated patient is on service
and note new SAt/Excercise for patient. Updated phys therapy cannot see in
home only RN.

## 2020-10-19 NOTE — NUR
[facility]ERR[facility] SPOKE WITH CHAIM IN INTAKE SHE RECEIVED ORDERS
AND WILL CALL PT TO ARRANGE VISITS.

## 2020-10-19 NOTE — NUR
PT walked pt and gave recommendation to Dr Hendricks, MK set up by kimberly, d/c
paperwork reviewed with pt, TL IJ removed, tele removed, pt verbalized
understanding of medications and will  her prescriptions from [facility]
on the way home.

## 2020-10-21 NOTE — EEG
[facility]
[address]
[place]  [address]                      ELECTROENCEPHALOGRAM          
_______________________________________________________________________________
 
Name:       DEL DE LEON                   Room #:         217-P       [facility] [place]  
M.R.#:      [id number]      Account #:      [id number]  
Admission:  10/07/20     Attend Phys:    Lior López MD
Discharge:  10/19/20     Date of Birth:  [birth year]  
                                                           Report #: [id number]
    [id number]  
_______________________________________________________________________________
THIS REPORT FOR:   //name//                          
 
CC: FAM unknown
    Lior López
 
DATE OF SERVICE:  10/09/2020
 
 
This patient is being evaluated for altered mental status.  EEG was done by
placing the electrode by standard 10-20 system of electrode placement.  Both
referential and sequential montages were used for recording.  Part of this EEG
is uninterpretable because of the artifact.  The portion which is interpretable
is severely abnormal.  It is disorganized and poorly formed.  It goes to about 6
Hz and 25 microvolt.
 
IMPRESSION:  This is an abnormal EEG because it is disorganized and poorly
formed.  That is a nonspecific finding, which can occur with encephalopathy,
effect of psychotropic medication, dementia, etc.  Clinical correlation is
recommended.
 
 
 
 
 
 
 
 
 
 
 
 
 
 
 
 
 
 
 
 
 
 
 
 
 
       <ELECTRONICALLY SIGNED>
   By: Orion Hargrove MD         
  10/21/20     1203
D: 10/12/20 1321                           _____________________________________
T: 10/12/20 1328                           Orion Hargrove MD           /nt

## 2020-10-21 NOTE — HC
[facility]
[address]
[place]   [address]                     CONSULTATION                  
_______________________________________________________________________________
 
Name:       DEL DE LEON                  Room #:         [address] [place] [address] [place]  
M.R.#:      [id number]                       Account #:      [id number]  
Admission:  10/07/20    Attend Phys:    Lior López MD 
Discharge:  10/19/20    Date of Birth:  [birth year]  
                                                          Report #: [id number]
                                                                    [id number]   
_______________________________________________________________________________
THIS REPORT FOR:  
 
cc:  FAM - Family physician unknown
     FAM - Family physician unknown                                       
     Orion Hargrove MD                                         ~
CC: TOM unknown
    Lior López
 
DATE OF SERVICE:  10/08/2020
 
 
HISTORY OF PRESENT ILLNESS:  This is a 60-year-old female patient who is unable
to provide any history.  I talked to the nurses looking after this patient and
subsequently, I was able to reach the patient's mother and I talked to her. 
Review of the records indicates that she had pain, according to the mother, pain
is longstanding.  It is in the back.  She apparently had a spinal stimulator and
was found with oxygen saturation in the 70s and snoring respiration.  She was
admitted.  She was intubated and the nurses tell me that at one time, she became
belligerent and pretty agitated and they have to sedate her.  When I saw her,
she was sedated and she was unresponsive.
 
REVIEW OF SYSTEMS:  Partly from the mother and partly from the record, she is
not a very good historian.  She said she had TIAs in the past.  She usually goes
to [facility].  I reviewed the pain management and multiple other
consultants' note.  She apparently has a history of diabetes and Endocrinology
has been consulted in that regard.  She does have a history of peripheral
neuropathy.  I am not sure how much it has affected her and how much is from the
back.  That is all the 14-point review of system I can get.
 
PAST MEDICAL HISTORY:  Positive for back pain.
 
FAMILY HISTORY:  Unremarkable.
 
SOCIAL HISTORY:  According to the mother, "she does not drink alcohol much."
 
PHYSICAL EXAMINATION:  Pretty limited.  She is sedated.  She is unresponsive.  I
think the pupils are symmetrical the best I can tell, she did not follow any
commands for me.  I cannot tell if she can move anything or not.  She does not
appear to have meningeal sign, the best I can tell.
 
LABORATORY DATA:  Her white count is 14.5.
 
IMPRESSION:  Neurological consultation is requested to prognosticate the patient
from the encephalopathy.  It looks like she did become hypoxic and she did get
pain medication.  We need to see how much hypoxic encephalopathy she has because
that portion may not be reversible, but according to the nurses, she was
 
 
 
[facility]
[address] [place]   [address]                     CONSULTATION                  
_______________________________________________________________________________
 
Name:       DEL DE LEON                  Room #:         [address] [place] [address] [place]  
M.R.#:      [id number]                       Account #:      [id number]  
Admission:  10/07/20    Attend Phys:    Lior López MD 
Discharge:  10/19/20    Date of Birth:  [birth year]  
                                                          Report #: [id number]
                                                                    [id number]   
_______________________________________________________________________________
agitated, but we just have to see.  We will start with an EEG in this patient
and do the further workup depending upon her condition tomorrow and depending
upon the EEG.  Dr. Ramírez will follow up this patient with you from tomorrow.
 
Thank you very much for allowing me to share in the management patient.  She has
numerous other problems as summarized in another consultant's note.  I will
defer the management to the hospitalist and other consultant and confine
ourselves to prognosticate the patient from her encephalopathy.
 
 
 
 
 
 
 
 
 
 
 
 
 
 
 
 
 
 
 
 
 
 
 
 
 
 
 
 
 
 
 
 
 
 
 
 
  <ELECTRONICALLY SIGNED>
   By: Orion Hargrove MD         
  10/21/20     1204
D: 10/08/20 2105                           _____________________________________
T: 10/09/20 0130                           Orion Hargrove MD           /nt